# Patient Record
Sex: FEMALE | Race: WHITE | Employment: FULL TIME | URBAN - METROPOLITAN AREA
[De-identification: names, ages, dates, MRNs, and addresses within clinical notes are randomized per-mention and may not be internally consistent; named-entity substitution may affect disease eponyms.]

---

## 2017-01-01 ENCOUNTER — HOSPITAL ENCOUNTER (EMERGENCY)
Age: 37
Discharge: HOME | End: 2017-01-01
Attending: EMERGENCY MEDICINE
Payer: SELF-PAY

## 2017-01-01 VITALS
SYSTOLIC BLOOD PRESSURE: 128 MMHG | DIASTOLIC BLOOD PRESSURE: 77 MMHG | WEIGHT: 145.2 LBS | OXYGEN SATURATION: 96 % | TEMPERATURE: 98.7 F | BODY MASS INDEX: 25.73 KG/M2 | RESPIRATION RATE: 14 BRPM | HEART RATE: 88 BPM | HEIGHT: 63 IN

## 2017-01-01 DIAGNOSIS — J06.9 ACUTE UPPER RESPIRATORY INFECTION: ICD-10-CM

## 2017-01-01 DIAGNOSIS — J01.00 ACUTE NON-RECURRENT MAXILLARY SINUSITIS: Primary | ICD-10-CM

## 2017-01-01 PROCEDURE — 99283 EMERGENCY DEPT VISIT LOW MDM: CPT | Performed by: EMERGENCY MEDICINE

## 2017-01-01 RX ORDER — AMOXICILLIN 500 MG/1
500 TABLET, FILM COATED ORAL 3 TIMES DAILY
Qty: 21 TAB | Refills: 0 | Status: SHIPPED | OUTPATIENT
Start: 2017-01-01 | End: 2017-06-20

## 2017-01-01 NOTE — ED PROVIDER NOTES
HPI Comments: Patient is a 54-year-old female who is coming in with several days of having multiple symptoms. She has had a lot of sore throat and 2 of her relatives are tested positive for strep throat. She's also had a lot of sinus pressure and had a feeling like she is going to have diarrhea but never did have any. She denies any fever she's had an intermittent cough. Patient is a 39 y.o. female presenting with sore throat. The history is provided by the patient. Sore Throat    Pertinent negatives include no diarrhea, no vomiting, no shortness of breath, no stridor and no cough. Past Medical History:   Diagnosis Date    Borderline personality disorder     Depression     GERD (gastroesophageal reflux disease)        Past Surgical History:   Procedure Laterality Date    Hx adenoidectomy       at age 11   [employer] Hx wisdom teeth extraction           History reviewed. No pertinent family history. Social History     Social History    Marital status: SINGLE     Spouse name: N/A    Number of children: N/A    Years of education: N/A     Occupational History    Not on file. Social History Main Topics    Smoking status: Current Every Day Smoker     Packs/day: 0.50    Smokeless tobacco: Not on file    Alcohol use Yes      Comment: occ    Drug use: No    Sexual activity: Not on file     Other Topics Concern    Not on file     Social History Narrative         ALLERGIES: Review of patient's allergies indicates no known allergies. Review of Systems   Constitutional: Negative for chills and fever. HENT: Positive for sore throat. Respiratory: Negative for cough, chest tightness, shortness of breath, wheezing and stridor. Cardiovascular: Negative for chest pain and palpitations. Gastrointestinal: Negative for abdominal pain, diarrhea, nausea and vomiting. Skin: Negative. All other systems reviewed and are negative.       Vitals:    01/01/17 0318   BP: 128/77   Pulse: 88   Resp: 14 Temp: 98.7 °F (37.1 °C)   SpO2: 96%   Weight: 65.9 kg (145 lb 3.2 oz)   Height: 5' 3\" (1.6 m)            Physical Exam   Constitutional: She is oriented to person, place, and time. She appears well-developed and well-nourished. No distress. HENT:   Head: Normocephalic and atraumatic. Tonsils slightly inflamed bilaterally no exudate seen. Maxillary and ethmoid sinus tenderness with palpation bilaterally. Eyes: Conjunctivae and EOM are normal. Pupils are equal, round, and reactive to light. No scleral icterus. Neck: Normal range of motion. Neck supple. Cardiovascular: Normal rate, regular rhythm and normal heart sounds. Pulmonary/Chest: Effort normal and breath sounds normal. No stridor. No respiratory distress. She has no wheezes. She has no rales. Abdominal: Soft. She exhibits no distension. There is no tenderness. There is no rebound and no guarding. Lymphadenopathy:     She has cervical adenopathy (slightly tender anterior cervical adenopathy. ). Neurological: She is alert and oriented to person, place, and time. No focal weakness   Skin: Skin is warm and dry. No rash noted. She is not diaphoretic. Psychiatric: She has a normal mood and affect. Her behavior is normal.   Nursing note and vitals reviewed. MDM  Number of Diagnoses or Management Options  Diagnosis management comments: Given patient's exposure to strep and symptoms I will start her on antibiotics.     ED Course       Procedures

## 2017-01-01 NOTE — ED NOTES
The patient was given their discharge instructions and  was given prescriptions. The  patient verbalized understanding and had no additional questions. The patient was alert and was discharged via Ambulatory, without additional complaints at time of discharge.   No apparent distress noted

## 2017-01-01 NOTE — DISCHARGE INSTRUCTIONS
Sinusitis: Care Instructions  Your Care Instructions    Sinusitis is an infection of the lining of the sinus cavities in your head. Sinusitis often follows a cold. It causes pain and pressure in your head and face. In most cases, sinusitis gets better on its own in 1 to 2 weeks. But some mild symptoms may last for several weeks. Sometimes antibiotics are needed. Follow-up care is a key part of your treatment and safety. Be sure to make and go to all appointments, and call your doctor if you are having problems. It's also a good idea to know your test results and keep a list of the medicines you take. How can you care for yourself at home? · Take an over-the-counter pain medicine, such as acetaminophen (Tylenol), ibuprofen (Advil, Motrin), or naproxen (Aleve). Read and follow all instructions on the label. · If the doctor prescribed antibiotics, take them as directed. Do not stop taking them just because you feel better. You need to take the full course of antibiotics. · Be careful when taking over-the-counter cold or flu medicines and Tylenol at the same time. Many of these medicines have acetaminophen, which is Tylenol. Read the labels to make sure that you are not taking more than the recommended dose. Too much acetaminophen (Tylenol) can be harmful. · Breathe warm, moist air from a steamy shower, a hot bath, or a sink filled with hot water. Avoid cold, dry air. Using a humidifier in your home may help. Follow the directions for cleaning the machine. · Use saline (saltwater) nasal washes to help keep your nasal passages open and wash out mucus and bacteria. You can buy saline nose drops at a grocery store or drugstore. Or you can make your own at home by adding 1 teaspoon of salt and 1 teaspoon of baking soda to 2 cups of distilled water. If you make your own, fill a bulb syringe with the solution, insert the tip into your nostril, and squeeze gently. Tan Rolls your nose.   · Put a hot, wet towel or a warm gel pack on your face 3 or 4 times a day for 5 to 10 minutes each time. · Try a decongestant nasal spray like oxymetazoline (Afrin). Do not use it for more than 3 days in a row. Using it for more than 3 days can make your congestion worse. When should you call for help? Call your doctor now or seek immediate medical care if:  · You have new or worse swelling or redness in your face or around your eyes. · You have a new or higher fever. Watch closely for changes in your health, and be sure to contact your doctor if:  · You have new or worse facial pain. · The mucus from your nose becomes thicker (like pus) or has new blood in it. · You are not getting better as expected. Where can you learn more? Go to http://adalberto-viktoriya.info/. Enter X964 in the search box to learn more about \"Sinusitis: Care Instructions. \"  Current as of: July 29, 2016  Content Version: 11.1  © 5454-6068 [employer]. Care instructions adapted under license by [employer] (which disclaims liability or warranty for this information). If you have questions about a medical condition or this instruction, always ask your healthcare professional. [place] [address] any warranty or liability for your use of this information.

## 2017-06-20 ENCOUNTER — HOSPITAL ENCOUNTER (EMERGENCY)
Age: 37
Discharge: HOME | End: 2017-06-20
Attending: EMERGENCY MEDICINE
Payer: SELF-PAY

## 2017-06-20 VITALS
OXYGEN SATURATION: 99 % | WEIGHT: 131 LBS | TEMPERATURE: 98.2 F | BODY MASS INDEX: 22.36 KG/M2 | RESPIRATION RATE: 18 BRPM | DIASTOLIC BLOOD PRESSURE: 75 MMHG | SYSTOLIC BLOOD PRESSURE: 125 MMHG | HEIGHT: 64 IN | HEART RATE: 85 BPM

## 2017-06-20 DIAGNOSIS — L08.9 SKIN INFECTION: Primary | ICD-10-CM

## 2017-06-20 PROCEDURE — 99283 EMERGENCY DEPT VISIT LOW MDM: CPT | Performed by: EMERGENCY MEDICINE

## 2017-06-20 PROCEDURE — 74011250637 HC RX REV CODE- 250/637: Performed by: EMERGENCY MEDICINE

## 2017-06-20 RX ORDER — MEDROXYPROGESTERONE ACETATE 150 MG/ML
150 INJECTION, SUSPENSION INTRAMUSCULAR
COMMUNITY
End: 2018-08-12

## 2017-06-20 RX ORDER — SULFAMETHOXAZOLE AND TRIMETHOPRIM 800; 160 MG/1; MG/1
1 TABLET ORAL 2 TIMES DAILY
Qty: 14 TAB | Refills: 0 | Status: SHIPPED | OUTPATIENT
Start: 2017-06-20 | End: 2017-06-27

## 2017-06-20 RX ORDER — SULFAMETHOXAZOLE AND TRIMETHOPRIM 800; 160 MG/1; MG/1
1 TABLET ORAL
Status: COMPLETED | OUTPATIENT
Start: 2017-06-20 | End: 2017-06-20

## 2017-06-20 RX ADMIN — SULFAMETHOXAZOLE AND TRIMETHOPRIM 1 TABLET: 800; 160 TABLET ORAL at 02:37

## 2017-06-20 NOTE — DISCHARGE INSTRUCTIONS

## 2017-06-20 NOTE — ED PROVIDER NOTES
HPI Comments: Patient states she thinks she was bitten by a bug on the back of her left lower leg 2 days ago. The area started swelling and draining slightly. She squeezed it and got some fluid out of it earlier tonight. She has not taken any medicine for her symptoms and denies any aggravating or relieving factors. She denies similar symptoms in the past.    Elements of this note were made using speech recognition software. As such, errors of speech recognition may occur. Patient is a 40 y.o. female presenting with skin problem. The history is provided by the patient. Skin Problem           Past Medical History:   Diagnosis Date    Borderline personality disorder     Depression     GERD (gastroesophageal reflux disease)        Past Surgical History:   Procedure Laterality Date    HX ADENOIDECTOMY      at age 11   [de-identified] WISDOM TEETH EXTRACTION           No family history on file. Social History     Social History    Marital status: SINGLE     Spouse name: N/A    Number of children: N/A    Years of education: N/A     Occupational History    Not on file. Social History Main Topics    Smoking status: Current Every Day Smoker     Packs/day: 0.50    Smokeless tobacco: Not on file    Alcohol use Yes      Comment: rare    Drug use: No    Sexual activity: Not on file     Other Topics Concern    Not on file     Social History Narrative         ALLERGIES: Review of patient's allergies indicates no known allergies. Review of Systems   Constitutional: Negative for chills and fever. Gastrointestinal: Negative for nausea and vomiting. All other systems reviewed and are negative. Vitals:    06/20/17 0029   BP: 131/82   Pulse: 90   Resp: 18   Temp: 98.2 °F (36.8 °C)   SpO2: 98%   Weight: 59.4 kg (131 lb)   Height: 5' 4\" (1.626 m)            Physical Exam   Constitutional: She is oriented to person, place, and time. She appears well-developed and well-nourished.    HENT:   Head: Normocephalic and atraumatic. Eyes: Conjunctivae are normal. Pupils are equal, round, and reactive to light. Neck: Normal range of motion. Neck supple. Musculoskeletal: She exhibits no edema or tenderness. Legs:  Small area of induration left lower posterior calf midline  As indicated. There is induration but no fluctuance   Neurological: She is alert and oriented to person, place, and time. Skin: Skin is warm and dry. Psychiatric: She has a normal mood and affect. Her behavior is normal.   Nursing note and vitals reviewed. MDM  Number of Diagnoses or Management Options  Skin infection: new and does not require workup  Diagnosis management comments: Differential diagnosis: Cellulitis, erysipelas, abscess  2:27 AM area is not amenable to incision and drainage.   Discussed this with patient, need for antibiotics    Risk of Complications, Morbidity, and/or Mortality  Presenting problems: moderate  Diagnostic procedures: moderate  Management options: moderate    Patient Progress  Patient progress: stable    ED Course       Procedures

## 2017-06-20 NOTE — LETTER
[address] [facility] EMERGENCY DEPT 
[facility] [address] [place] [id number] 
[phone] Work/School Note Date: 6/20/2017 To Whom It May concern: 
 
Edilberto Marley was seen and treated today in the emergency room by the following provider(s): 
Attending Provider: Jorge Dixon MD.   
 
Edilberto Marley may return to work on Wednesday 6/21/17 Sincerely, Valentín Dallas RN

## 2017-06-20 NOTE — ED NOTES
Gave med as ordered.  Pt verbalized understanding of discharge instructions and prescription, signed form and ambualted out in nad

## 2018-08-12 ENCOUNTER — HOSPITAL ENCOUNTER (EMERGENCY)
Age: 38
Discharge: HOME | End: 2018-08-12
Attending: EMERGENCY MEDICINE
Payer: COMMERCIAL

## 2018-08-12 ENCOUNTER — APPOINTMENT (OUTPATIENT)
Dept: GENERAL RADIOLOGY | Age: 38
End: 2018-08-12
Attending: EMERGENCY MEDICINE
Payer: COMMERCIAL

## 2018-08-12 VITALS
WEIGHT: 128.4 LBS | HEART RATE: 74 BPM | DIASTOLIC BLOOD PRESSURE: 85 MMHG | HEIGHT: 64 IN | OXYGEN SATURATION: 99 % | SYSTOLIC BLOOD PRESSURE: 135 MMHG | TEMPERATURE: 98.1 F | RESPIRATION RATE: 16 BRPM | BODY MASS INDEX: 21.92 KG/M2
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
## 2018-08-12 DIAGNOSIS — R07.81 RIB PAIN ON LEFT SIDE: ICD-10-CM

## 2018-08-12 LAB — HCG UR QL: NEGATIVE

## 2018-08-12 PROCEDURE — 74011250636 HC RX REV CODE- 250/636: Performed by: NURSE PRACTITIONER

## 2018-08-12 PROCEDURE — 81025 URINE PREGNANCY TEST: CPT

## 2018-08-12 PROCEDURE — 90471 IMMUNIZATION ADMIN: CPT | Performed by: NURSE PRACTITIONER

## 2018-08-12 PROCEDURE — 71100 X-RAY EXAM RIBS UNI 2 VIEWS: CPT

## 2018-08-12 PROCEDURE — 90715 TDAP VACCINE 7 YRS/> IM: CPT | Performed by: NURSE PRACTITIONER

## 2018-08-12 PROCEDURE — 73130 X-RAY EXAM OF HAND: CPT

## 2018-08-12 PROCEDURE — 99283 EMERGENCY DEPT VISIT LOW MDM: CPT | Performed by: NURSE PRACTITIONER

## 2018-08-12 RX ORDER — TRAMADOL HYDROCHLORIDE 50 MG/1
50 TABLET ORAL
Qty: 20 TAB | Refills: 0 | Status: SHIPPED | OUTPATIENT
Start: 2018-08-12 | End: 2019-10-01

## 2018-08-12 RX ADMIN — TETANUS TOXOID, REDUCED DIPHTHERIA TOXOID AND ACELLULAR PERTUSSIS VACCINE, ADSORBED 0.5 ML: 5; 2.5; 8; 8; 2.5 SUSPENSION INTRAMUSCULAR at 16:32

## 2018-08-12 NOTE — LETTER
[address] [facility] EMERGENCY DEPT 
[facility] [address] [place] [id number] 
[phone] Work/School Note Date: 8/12/2018 To Whom It May concern: 
 
Lisa Fung was seen and treated today in the emergency room by the following provider(s): 
Attending Provider: Brea Bowers MD 
Nurse Practitioner: Katja Schilling NP. Samia Arizmendi may return to work on tomorrow with light duty for three days. Sincerely, Katja Schilling NP
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
## 2018-08-12 NOTE — ED NOTES
I have reviewed discharge instructions with the patient. The patient verbalized understanding. Patient left ED via Discharge Method: ambulatory to Home with self. Opportunity for questions and clarification provided. Patient given 1 scripts. To continue your aftercare when you leave the hospital, you may receive an automated call from our care team to check in on how you are doing. This is a free service and part of our promise to provide the best care and service to meet your aftercare needs.  If you have questions, or wish to unsubscribe from this service please call [phone]. Thank you for Choosing our [address] [place] [address] Emergency Department.

## 2018-08-12 NOTE — DISCHARGE INSTRUCTIONS
Musculoskeletal Chest Pain: Care Instructions  Your Care Instructions    Chest pain is not always a sign that something is wrong with your heart or that you have another serious problem. The doctor thinks your chest pain is caused by strained muscles or ligaments, inflamed chest cartilage, or another problem in your chest, rather than by your heart. You may need more tests to find the cause of your chest pain. Follow-up care is a key part of your treatment and safety. Be sure to make and go to all appointments, and call your doctor if you are having problems. It's also a good idea to know your test results and keep a list of the medicines you take. How can you care for yourself at home? · Take pain medicines exactly as directed. ¨ If the doctor gave you a prescription medicine for pain, take it as prescribed. ¨ If you are not taking a prescription pain medicine, ask your doctor if you can take an over-the-counter medicine. · Rest and protect the sore area. · Stop, change, or take a break from any activity that may be causing your pain or soreness. · Put ice or a cold pack on the sore area for 10 to 20 minutes at a time. Try to do this every 1 to 2 hours for the next 3 days (when you are awake) or until the swelling goes down. Put a thin cloth between the ice and your skin. · After 2 or 3 days, apply a heating pad set on low or a warm cloth to the area that hurts. Some doctors suggest that you go back and forth between hot and cold. · Do not wrap or tape your ribs for support. This may cause you to take smaller breaths, which could increase your risk of lung problems. · Mentholated creams such as Bengay or Icy Hot may soothe sore muscles. Follow the instructions on the package. · Follow your doctor's instructions for exercising. · Gentle stretching and massage may help you get better faster. Stretch slowly to the point just before pain begins, and hold the stretch for at least 15 to 30 seconds.  Do this 3 or 4 times a day. Stretch just after you have applied heat. · As your pain gets better, slowly return to your normal activities. Any increased pain may be a sign that you need to rest a while longer. When should you call for help? Call 911 anytime you think you may need emergency care. For example, call if:    · You have chest pain or pressure. This may occur with:  ¨ Sweating. ¨ Shortness of breath. ¨ Nausea or vomiting. ¨ Pain that spreads from the chest to the neck, jaw, or one or both shoulders or arms. ¨ Dizziness or lightheadedness. ¨ A fast or uneven pulse. After calling 911, chew 1 adult-strength aspirin. Wait for an ambulance. Do not try to drive yourself.     · You have sudden chest pain and shortness of breath, or you cough up blood.    Call your doctor now or seek immediate medical care if:    · You have any trouble breathing.     · Your chest pain gets worse.     · Your chest pain occurs consistently with exercise and is relieved by rest.    Watch closely for changes in your health, and be sure to contact your doctor if:    · Your chest pain does not get better after 1 week. Where can you learn more? Go to http://adalberto-viktoriya.info/. Enter V293 in the search box to learn more about \"Musculoskeletal Chest Pain: Care Instructions. \"  Current as of: November 20, 2017  Content Version: 11.7  © 1002-8683 [employer]. Care instructions adapted under license by [employer] (which disclaims liability or warranty for this information). If you have questions about a medical condition or this instruction, always ask your healthcare professional. [place] [address] any warranty or liability for your use of this information.
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
## 2019-10-01 ENCOUNTER — HOSPITAL ENCOUNTER (EMERGENCY)
Age: 39
Discharge: HOME | End: 2019-10-01
Attending: EMERGENCY MEDICINE
Payer: COMMERCIAL

## 2019-10-01 VITALS
BODY MASS INDEX: 22.04 KG/M2 | RESPIRATION RATE: 16 BRPM | SYSTOLIC BLOOD PRESSURE: 104 MMHG | TEMPERATURE: 98.2 F | DIASTOLIC BLOOD PRESSURE: 63 MMHG | OXYGEN SATURATION: 100 % | HEIGHT: 64 IN | HEART RATE: 78 BPM

## 2019-10-01 DIAGNOSIS — S39.012A ACUTE MYOFASCIAL STRAIN OF LUMBAR REGION, INITIAL ENCOUNTER: Primary | ICD-10-CM

## 2019-10-01 PROCEDURE — 99283 EMERGENCY DEPT VISIT LOW MDM: CPT | Performed by: EMERGENCY MEDICINE

## 2019-10-01 RX ORDER — PREDNISONE 20 MG/1
40 TABLET ORAL DAILY
Qty: 10 TAB | Refills: 0 | Status: SHIPPED | OUTPATIENT
Start: 2019-10-01 | End: 2019-10-06

## 2019-10-01 RX ORDER — NAPROXEN 500 MG/1
500 TABLET ORAL 2 TIMES DAILY WITH MEALS
Qty: 20 TAB | Refills: 0 | Status: SHIPPED | OUTPATIENT
Start: 2019-10-01 | End: 2020-11-10

## 2019-10-01 RX ORDER — METHOCARBAMOL 750 MG/1
750 TABLET, FILM COATED ORAL 4 TIMES DAILY
Qty: 40 TAB | Refills: 0 | Status: SHIPPED | OUTPATIENT
Start: 2019-10-01 | End: 2020-11-10

## 2019-10-01 NOTE — ED TRIAGE NOTES
Lower back pain for one month and it is getting worse. 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 States \"pain is on right side and goes into butt cheek. \"

## 2019-10-01 NOTE — ED NOTES
I have reviewed discharge instructions with the patient. The patient verbalized understanding. Patient left ED via Discharge Method: ambulatory to Home with self. Opportunity for questions and clarification provided. Patient given 3 scripts. To continue your aftercare when you leave the hospital, you may receive an automated call from our care team to check in on how you are doing. This is a free service and part of our promise to provide the best care and service to meet your aftercare needs.  If you have questions, or wish to unsubscribe from this service please call [phone]. Thank you for Choosing our [facility] Emergency Department.

## 2019-10-01 NOTE — DISCHARGE INSTRUCTIONS
No lifting, bending or other strenous activities  Call your doctor/follow up doctor to set up appointment for recheck visit  Return to ER for any worsening symptoms or new problems which may arise

## 2019-10-01 NOTE — ED PROVIDER NOTES
The history is provided by the patient. LOW BACK PAIN    This is a new problem. The current episode started more than 2 days ago. The problem has been gradually worsening. The problem occurs constantly. The pain is associated with no known injury. The pain is present in the lower back and right side. The quality of the pain is described as aching and burning. Radiates to: Right hip. The pain is moderate. The symptoms are aggravated by twisting, bending and certain positions. Pertinent negatives include no chest pain, no fever, no numbness, no weight loss, no headaches, no abdominal pain, no bowel incontinence, no perianal numbness, no bladder incontinence, no dysuria, no paresis, no tingling and no weakness. She has tried nothing for the symptoms. The patient's surgical history non-contributory        Past Medical History:   Diagnosis Date    Borderline personality disorder ([address]     Depression     GERD (gastroesophageal reflux disease)        Past Surgical History:   Procedure Laterality Date    HX ADENOIDECTOMY      at age 11   [de-identified] WISDOM TEETH EXTRACTION           History reviewed. No pertinent family history.     Social History     Socioeconomic History    Marital status: SINGLE     Spouse name: Not on file    Number of children: Not on file    Years of education: Not on file    Highest education level: Not on file   Occupational History    Not on file   Social Needs    Financial resource strain: Not on file    Food insecurity:     Worry: Not on file     Inability: Not on file    Transportation needs:     Medical: Not on file     Non-medical: Not on file   Tobacco Use    Smoking status: Current Every Day Smoker     Packs/day: 0.50    Smokeless tobacco: Never Used   Substance and Sexual Activity    Alcohol use: Yes     Comment: rare    Drug use: No    Sexual activity: Not on file   Lifestyle    Physical activity:     Days per week: Not on file     Minutes per session: Not on file    Stress: Not on file   Relationships    Social connections:     Talks on phone: Not on file     Gets together: Not on file     Attends [employer] service: Not on file     Active member of club or organization: Not on file     Attends meetings of clubs or organizations: Not on file     Relationship status: Not on file    Intimate partner violence:     Fear of current or ex partner: Not on file     Emotionally abused: Not on file     Physically abused: Not on file     Forced sexual activity: Not on file   Other Topics Concern    Not on file   Social History Narrative    Not on file         ALLERGIES: Patient has no known allergies. Review of Systems   Constitutional: Negative for chills, fever and weight loss. HENT: Negative for congestion, ear pain and rhinorrhea. Eyes: Negative for photophobia and discharge. Respiratory: Negative for cough and shortness of breath. Cardiovascular: Negative for chest pain and palpitations. Gastrointestinal: Negative for abdominal pain, bowel incontinence, constipation, diarrhea and vomiting. Endocrine: Negative for cold intolerance and heat intolerance. Genitourinary: Negative for bladder incontinence, dysuria and flank pain. Musculoskeletal: Negative for arthralgias, myalgias and neck pain. Skin: Negative for rash and wound. Allergic/Immunologic: Negative for environmental allergies and food allergies. Neurological: Negative for tingling, syncope, weakness, numbness and headaches. Hematological: Negative for adenopathy. Does not bruise/bleed easily. Psychiatric/Behavioral: Negative for dysphoric mood. The patient is not nervous/anxious. All other systems reviewed and are negative. Vitals:    10/01/19 1717   BP: 96/61   Pulse: 84   Resp: 16   Temp: 98.8 °F (37.1 °C)   SpO2: 98%   Height: 5' 4\" (1.626 m)            Physical Exam   Constitutional: She is oriented to person, place, and time. She appears well-developed and well-nourished.  She appears distressed. HENT:   Head: Normocephalic and atraumatic. Mouth/Throat: Oropharynx is clear and moist. No oropharyngeal exudate. Eyes: Pupils are equal, round, and reactive to light. Conjunctivae and EOM are normal.   Neck: Normal range of motion. Neck supple. No JVD present. Cardiovascular: Normal rate, regular rhythm, normal heart sounds and intact distal pulses. Exam reveals no gallop and no friction rub. No murmur heard. Pulmonary/Chest: Effort normal and breath sounds normal.   Abdominal: Soft. Normal appearance and bowel sounds are normal. She exhibits no distension and no mass. There is no hepatosplenomegaly. There is no tenderness. Musculoskeletal: Normal range of motion. She exhibits no edema or deformity. Back:    Neurological: She is alert and oriented to person, place, and time. She has normal strength. No cranial nerve deficit or sensory deficit. She displays a negative Romberg sign. Gait normal. GCS eye subscore is 4. GCS verbal subscore is 5. GCS motor subscore is 6. Straight leg raise is negative     Skin: Skin is warm and dry. Capillary refill takes less than 2 seconds. No rash noted. Psychiatric: She has a normal mood and affect. Her speech is normal and behavior is normal. Judgment and thought content normal. Cognition and memory are normal.   Nursing note and vitals reviewed. MDM  Number of Diagnoses or Management Options  Acute myofascial strain of lumbar region, initial encounter: new and does not require workup  Diagnosis management comments: Symptoms are consistent with a mild sciatica and likely lumbar muscle strain    Neurologically the patient is intact with no other concerning signs or symptoms today.     Will initiate symptomatic treatment with steroids and muscle relaxers  The primary care for follow-up       Amount and/or Complexity of Data Reviewed  Review and summarize past medical records: yes    Risk of Complications, Morbidity, and/or Mortality  Presenting problems: low  Diagnostic procedures: minimal  Management options: low  General comments: Elements of this note have been dictated via voice recognition software. Text and phrases may be limited by the accuracy of the software. The chart has been reviewed, but errors may still be present.       Patient Progress  Patient progress: stable         Procedures

## 2020-02-06 ENCOUNTER — HOSPITAL ENCOUNTER (EMERGENCY)
Age: 40
Discharge: HOME | End: 2020-02-06
Attending: EMERGENCY MEDICINE
Payer: COMMERCIAL

## 2020-02-06 VITALS
RESPIRATION RATE: 20 BRPM | HEIGHT: 64 IN | TEMPERATURE: 98.4 F | DIASTOLIC BLOOD PRESSURE: 80 MMHG | HEART RATE: 75 BPM | OXYGEN SATURATION: 100 % | WEIGHT: 125.5 LBS | BODY MASS INDEX: 21.43 KG/M2 | SYSTOLIC BLOOD PRESSURE: 124 MMHG

## 2020-02-06 DIAGNOSIS — Z87.448 HISTORY OF HEMATURIA: ICD-10-CM

## 2020-02-06 DIAGNOSIS — R82.81 PYURIA: Primary | ICD-10-CM

## 2020-02-06 LAB
BACTERIA URNS QL MICRO: 0 /HPF
CASTS URNS QL MICRO: NORMAL /LPF
EPI CELLS #/AREA URNS HPF: NORMAL /HPF
HCG UR QL: NEGATIVE
RBC #/AREA URNS HPF: NORMAL /HPF
WBC URNS QL MICRO: NORMAL /HPF

## 2020-02-06 PROCEDURE — 87491 CHLMYD TRACH DNA AMP PROBE: CPT

## 2020-02-06 PROCEDURE — 99284 EMERGENCY DEPT VISIT MOD MDM: CPT

## 2020-02-06 PROCEDURE — 74011000250 HC RX REV CODE- 250: Performed by: EMERGENCY MEDICINE

## 2020-02-06 PROCEDURE — 81003 URINALYSIS AUTO W/O SCOPE: CPT

## 2020-02-06 PROCEDURE — 74011250637 HC RX REV CODE- 250/637: Performed by: EMERGENCY MEDICINE

## 2020-02-06 PROCEDURE — 74011250636 HC RX REV CODE- 250/636: Performed by: EMERGENCY MEDICINE

## 2020-02-06 PROCEDURE — 81015 MICROSCOPIC EXAM OF URINE: CPT

## 2020-02-06 PROCEDURE — 81025 URINE PREGNANCY TEST: CPT

## 2020-02-06 PROCEDURE — 96372 THER/PROPH/DIAG INJ SC/IM: CPT

## 2020-02-06 RX ORDER — AZITHROMYCIN 250 MG/1
1000 TABLET, FILM COATED ORAL
Status: COMPLETED | OUTPATIENT
Start: 2020-02-06 | End: 2020-02-06

## 2020-02-06 RX ADMIN — LIDOCAINE HYDROCHLORIDE 250 MG: 10 INJECTION, SOLUTION INFILTRATION; PERINEURAL at 19:43

## 2020-02-06 RX ADMIN — AZITHROMYCIN 1000 MG: 250 TABLET, FILM COATED ORAL at 19:42

## 2020-02-06 NOTE — ED NOTES
Patient to ED via POV. Patient reports she recently had a physical and was told she had hematuria. Patient denies any painful urination. Patient does report some polyuria, low back pain increasing over past few weeks. Patient reports [facility] appx 2 weeks ago. Patient also inquires about STD testing. Patient denies any vag discharge, vaginal pain.

## 2020-02-07 NOTE — DISCHARGE INSTRUCTIONS
Follow-up with the doctor provided in 3 to 5 days for follow-up of urine culture and further outpatient evaluation. Return if any new, worsening or concerning symptoms.

## 2020-02-07 NOTE — ED NOTES
I have reviewed discharge instructions with the patient. The patient verbalized understanding. Patient left ED via Discharge Method: ambulatory to Home with self. Opportunity for questions and clarification provided. Patient given 0 scripts. To continue your aftercare when you leave the hospital, you may receive an automated call from our care team to check in on how you are doing. This is a free service and part of our promise to provide the best care and service to meet your aftercare needs.  If you have questions, or wish to unsubscribe from this service please call [phone]. Thank you for Choosing our [facility] Emergency Department.

## 2020-02-07 NOTE — ED PROVIDER NOTES
27-year-old female complains that very recently in a physical for work at [facility] [employer] they found the blood in her urine. The blood was microscopic. She denies any urinary symptoms. She has had some intermittent low back pain that is worse with movement. She is concerned about STDs due to some recent unprotected sex but denies any vaginal discharge or vaginal bleeding. No urgency frequency or dysuria. No fevers or chills. The history is provided by the patient. Past Medical History:   Diagnosis Date    Borderline personality disorder ([address]     Depression     GERD (gastroesophageal reflux disease)        Past Surgical History:   Procedure Laterality Date    HX ADENOIDECTOMY      at age 11   Luzmaria Schilder WISDOM TEETH EXTRACTION           No family history on file.     Social History     Socioeconomic History    Marital status: SINGLE     Spouse name: Not on file    Number of children: Not on file    Years of education: Not on file    Highest education level: Not on file   Occupational History    Not on file   Social Needs    Financial resource strain: Not on file    Food insecurity:     Worry: Not on file     Inability: Not on file    Transportation needs:     Medical: Not on file     Non-medical: Not on file   Tobacco Use    Smoking status: Current Every Day Smoker     Packs/day: 0.50    Smokeless tobacco: Never Used   Substance and Sexual Activity    Alcohol use: Yes     Comment: rare    Drug use: No    Sexual activity: Not on file   Lifestyle    Physical activity:     Days per week: Not on file     Minutes per session: Not on file    Stress: Not on file   Relationships    Social connections:     Talks on phone: Not on file     Gets together: Not on file     Attends [employer] service: Not on file     Active member of club or organization: Not on file     Attends meetings of clubs or organizations: Not on file     Relationship status: Not on file    Intimate partner violence:     Fear of current or ex partner: Not on file     Emotionally abused: Not on file     Physically abused: Not on file     Forced sexual activity: Not on file   Other Topics Concern    Not on file   Social History Narrative    Not on file         ALLERGIES: Patient has no known allergies. Review of Systems   Constitutional: Negative for fever. Gastrointestinal: Negative for abdominal pain, constipation, diarrhea, nausea and vomiting. Endocrine: Negative for polyuria. Genitourinary: Negative for dysuria, frequency, urgency, vaginal bleeding and vaginal discharge. Musculoskeletal: Positive for back pain. Neurological: Negative for weakness and numbness. Vitals:    02/06/20 1843   BP: 124/82   Pulse: 75   Resp: 20   Temp: 98.2 °F (36.8 °C)   SpO2: 98%   Weight: 56.9 kg (125 lb 8 oz)   Height: 5' 4\" (1.626 m)            Physical Exam  Vitals signs and nursing note reviewed. Constitutional:       General: She is not in acute distress. Appearance: Normal appearance. She is well-developed. HENT:      Head: Normocephalic. Eyes:      Pupils: Pupils are equal, round, and reactive to light. Cardiovascular:      Rate and Rhythm: Normal rate and regular rhythm. Heart sounds: Normal heart sounds. Pulmonary:      Effort: Pulmonary effort is normal.      Breath sounds: Normal breath sounds. Abdominal:      General: There is no distension. Palpations: Abdomen is soft. There is no mass. Tenderness: There is no abdominal tenderness. There is no guarding or rebound. Musculoskeletal: Normal range of motion. General: No tenderness. Comments: No CVA tenderness   Lymphadenopathy:      Cervical: No cervical adenopathy. Skin:     General: Skin is warm and dry. Neurological:      Mental Status: She is alert. MDM  Number of Diagnoses or Management Options  Diagnosis management comments: History and exam and current clinical scenario not consistent with renal colic.   There is no microscopic hematuria at this time but there is some pyuria but no bacteriuria. Urine culture ordered. Gonorrhea and Chlamydia DNA probe on the urine is ordered. Patient offered and accepts treatment for STD. Urine pregnancy test was negative.        Amount and/or Complexity of Data Reviewed  Clinical lab tests: ordered and reviewed (Results for orders placed or performed during the hospital encounter of 02/06/20  -URINE MICROSCOPIC       Result                      Value             Ref Range           WBC                         20-50             0 /hpf              RBC                         0-3               0 /hpf              Epithelial cells            0-3               0 /hpf              Bacteria                    0                 0 /hpf              Casts                       0-3               0 /lpf         -HCG URINE, QL. - POC       Result                      Value             Ref Range           Pregnancy test,urine (*     NEGATIVE          NEG            )           Procedures

## 2020-02-11 LAB
C TRACH RRNA SPEC QL NAA+PROBE: NEGATIVE
N GONORRHOEA RRNA SPEC QL NAA+PROBE: NEGATIVE
SPECIMEN SOURCE: NORMAL

## 2020-11-10 ENCOUNTER — HOSPITAL ENCOUNTER (EMERGENCY)
Age: 40
Discharge: HOME | End: 2020-11-10
Attending: EMERGENCY MEDICINE
Payer: MEDICAID

## 2020-11-10 VITALS
HEIGHT: 64 IN | TEMPERATURE: 98.3 F | SYSTOLIC BLOOD PRESSURE: 151 MMHG | HEART RATE: 76 BPM | WEIGHT: 125.44 LBS | RESPIRATION RATE: 16 BRPM | DIASTOLIC BLOOD PRESSURE: 81 MMHG | BODY MASS INDEX: 21.42 KG/M2 | OXYGEN SATURATION: 98 %

## 2020-11-10 DIAGNOSIS — N39.0 URINARY TRACT INFECTION WITHOUT HEMATURIA, SITE UNSPECIFIED: Primary | ICD-10-CM

## 2020-11-10 LAB
BACTERIA URNS QL MICRO: NORMAL /HPF
CASTS URNS QL MICRO: 0 /LPF
CRYSTALS URNS QL MICRO: 0 /LPF
EPI CELLS #/AREA URNS HPF: NORMAL /HPF
HCG UR QL: NEGATIVE
MUCOUS THREADS URNS QL MICRO: 0 /LPF
RBC #/AREA URNS HPF: NORMAL /HPF
WBC URNS QL MICRO: NORMAL /HPF

## 2020-11-10 PROCEDURE — 81003 URINALYSIS AUTO W/O SCOPE: CPT

## 2020-11-10 PROCEDURE — 81015 MICROSCOPIC EXAM OF URINE: CPT

## 2020-11-10 PROCEDURE — 81025 URINE PREGNANCY TEST: CPT

## 2020-11-10 PROCEDURE — 99284 EMERGENCY DEPT VISIT MOD MDM: CPT

## 2020-11-10 RX ORDER — CEPHALEXIN 500 MG/1
500 CAPSULE ORAL 4 TIMES DAILY
Qty: 28 CAP | Refills: 0 | Status: SHIPPED | OUTPATIENT
Start: 2020-11-10 | End: 2020-11-17

## 2020-11-10 NOTE — ED NOTES
I have reviewed discharge instructions with the patient. The patient verbalized understanding. Patient left ED via Discharge Method: ambulatory to Home with self. Opportunity for questions and clarification provided. Patient given 1 scripts. To continue your aftercare when you leave the hospital, you may receive an automated call from our care team to check in on how you are doing. This is a free service and part of our promise to provide the best care and service to meet your aftercare needs.  If you have questions, or wish to unsubscribe from this service please call [phone]. Thank you for Choosing our [facility] Emergency Department.

## 2020-11-10 NOTE — DISCHARGE INSTRUCTIONS

## 2020-11-10 NOTE — ED TRIAGE NOTES
Pt c/o burning with urination. States she stood up to go into work and she urinated on herself which is abnormal for her. States started yesterday.

## 2020-11-10 NOTE — LETTER
[id number] [address] EMERGENCY DEPT 
[id number] [facility] [id number] 
[phone] Work/School Note Date: 11/10/2020 To Whom It May concern: 
 
 
Fish Lemon was seen and treated today in the emergency room by the following provider(s): 
Attending Provider: Luz Elena Degroot MD 
Physician Assistant: MAXWELL Rosario. Fish Lemon is excused from work/school on 11/10/20. She is clear to return to work/school on 11/11/20. Sincerely, MAXWELL Portillo

## 2020-11-10 NOTE — ED PROVIDER NOTES
The history is provided by the patient. Urinary Pain    This is a new problem. The current episode started yesterday. The problem occurs every urination. The problem has been gradually worsening. The quality of the pain is described as aching. The pain is at a severity of 1/10. The pain is mild. She is sexually active. There is no history of pyelonephritis. Associated symptoms include frequency. The patient is not pregnant. She has tried nothing for the symptoms. The treatment provided no relief. Her past medical history does not include kidney stones or recurrent UTIs. Past Medical History:   Diagnosis Date    Borderline [address] disorder ([facility] [address]     Depression     GERD (gastroesophageal reflux disease)        Past Surgical History:   Procedure Laterality Date    HX ADENOIDECTOMY      at age 11   Phineas Began WISDOM TEETH EXTRACTION           History reviewed. No pertinent family history.     Social History     Socioeconomic History    Marital status: SINGLE     Spouse name: Not on file    Number of children: Not on file    Years of education: Not on file    Highest education level: Not on file   Occupational History    Not on file   Social Needs    Financial resource strain: Not on file    Food insecurity     Worry: Not on file     Inability: Not on file    Transportation needs     Medical: Not on file     Non-medical: Not on file   Tobacco Use    Smoking status: Current Every Day Smoker     Packs/day: 0.50    Smokeless tobacco: Never Used   Substance and Sexual Activity    Alcohol use: Yes     Comment: rare    Drug use: No    Sexual activity: Not on file   Lifestyle    Physical activity     Days per week: Not on file     Minutes per session: Not on file    Stress: Not on file   Relationships    Social connections     Talks on phone: Not on file     Gets together: Not on file     Attends [employer] service: Not on file     Active member of club or organization: Not on file     Attends meetings of clubs or organizations: Not on file     Relationship status: Not on file    Intimate partner violence     Fear of current or ex partner: Not on file     Emotionally abused: Not on file     Physically abused: Not on file     Forced sexual activity: Not on file   Other Topics Concern    Not on file   Social History Narrative    Not on file         ALLERGIES: Patient has no known allergies. Review of Systems   Genitourinary: Positive for frequency. All other systems reviewed and are negative. Vitals:    11/10/20 1115   BP: (!) 151/81   Pulse: 76   Resp: 16   Temp: 98.3 °F (36.8 °C)   SpO2: 98%   Weight: 56.9 kg (125 lb 7.1 oz)   Height: 5' 4\" (1.626 m)            Physical Exam  Vitals signs and nursing note reviewed. Constitutional:       General: She is not in acute distress. Appearance: Normal appearance. She is well-developed and normal weight. She is not diaphoretic. HENT:      Head: Normocephalic and atraumatic. Eyes:      Pupils: Pupils are equal, round, and reactive to light. Neck:      Musculoskeletal: Normal range of motion and neck supple. Cardiovascular:      Rate and Rhythm: Normal rate and regular rhythm. Pulmonary:      Effort: Pulmonary effort is normal.      Breath sounds: Normal breath sounds. Abdominal:      General: Abdomen is flat. Bowel sounds are normal.      Palpations: Abdomen is soft. Tenderness: There is no abdominal tenderness. Hernia: No hernia is present. Musculoskeletal: Normal range of motion. Skin:     General: Skin is warm. Neurological:      General: No focal deficit present. Mental Status: She is alert and oriented to person, place, and time.    Psychiatric:         Mood and Affect: Mood normal.         Behavior: Behavior normal.          MDM  Number of Diagnoses or Management Options  Diagnosis management comments: hcg -  Urine with wbc and bacteria will treat with keflex, work note given        Amount and/or Complexity of Data Reviewed  Clinical lab tests: ordered and reviewed  Review and summarize past medical records: yes    Risk of Complications, Morbidity, and/or Mortality  Presenting problems: low  Diagnostic procedures: low  Management options: low    Patient Progress  Patient progress: improved         Procedures

## 2021-01-13 PROBLEM — O09.529 AMA (ADVANCED MATERNAL AGE) MULTIGRAVIDA 35+: Status: ACTIVE | Noted: 2021-01-13

## 2021-01-13 PROBLEM — F32.A ANXIETY AND DEPRESSION: Status: ACTIVE | Noted: 2021-01-13

## 2021-01-13 PROBLEM — F19.21 HISTORY OF DRUG DEPENDENCE/ABUSE (HCC): Status: ACTIVE | Noted: 2021-01-13

## 2021-01-13 PROBLEM — F44.81 MULTIPLE PERSONALITY DISORDER (HCC): Status: ACTIVE | Noted: 2021-01-13

## 2021-01-13 PROBLEM — F41.9 ANXIETY AND DEPRESSION: Status: ACTIVE | Noted: 2021-01-13

## 2021-01-13 PROBLEM — O09.519 AMA (ADVANCED MATERNAL AGE) PRIMIGRAVIDA 35+: Status: ACTIVE | Noted: 2021-01-13

## 2021-01-13 PROBLEM — O99.330 TOBACCO USE IN PREGNANCY: Status: ACTIVE | Noted: 2021-01-13

## 2021-01-13 PROBLEM — O09.529 AMA (ADVANCED MATERNAL AGE) MULTIGRAVIDA 35+: Status: RESOLVED | Noted: 2021-01-13 | Resolved: 2021-01-13

## 2021-01-25 PROBLEM — O09.511 PRIMIGRAVIDA OF ADVANCED MATERNAL AGE IN FIRST TRIMESTER: Status: ACTIVE | Noted: 2021-01-13

## 2021-01-25 PROBLEM — Z63.79 MENTAL ILLNESS IN MEMBER OF HOUSEHOLD: Status: ACTIVE | Noted: 2021-01-13

## 2021-01-25 PROBLEM — Z86.59 HISTORY OF ANXIETY DISORDER: Status: ACTIVE | Noted: 2021-01-13

## 2021-01-26 PROBLEM — O99.340 PSYCHOLOGICAL DISORDER DURING PREGNANCY: Status: ACTIVE | Noted: 2021-01-13

## 2021-02-02 PROBLEM — A59.01 TRICHOMONAL VAGINITIS IN PREGNANCY: Status: ACTIVE | Noted: 2021-02-02

## 2021-02-02 PROBLEM — O23.599 TRICHOMONAL VAGINITIS IN PREGNANCY: Status: ACTIVE | Noted: 2021-02-02

## 2021-02-12 PROBLEM — O09.512 PRIMIGRAVIDA OF ADVANCED MATERNAL AGE IN SECOND TRIMESTER: Status: ACTIVE | Noted: 2021-01-13

## 2021-03-16 PROBLEM — O99.340 ANXIETY DURING PREGNANCY: Status: ACTIVE | Noted: 2021-01-13

## 2021-03-22 ENCOUNTER — HOSPITAL ENCOUNTER (OUTPATIENT)
Age: 41
Discharge: HOME | End: 2021-03-22
Attending: OBSTETRICS & GYNECOLOGY | Admitting: OBSTETRICS & GYNECOLOGY
Payer: MEDICAID

## 2021-03-22 VITALS
HEART RATE: 79 BPM | DIASTOLIC BLOOD PRESSURE: 62 MMHG | TEMPERATURE: 98.5 F | BODY MASS INDEX: 24.24 KG/M2 | OXYGEN SATURATION: 98 % | RESPIRATION RATE: 16 BRPM | WEIGHT: 142 LBS | SYSTOLIC BLOOD PRESSURE: 106 MMHG | HEIGHT: 64 IN

## 2021-03-22 PROBLEM — O36.8120 DECREASED FETAL MOVEMENT AFFECTING MANAGEMENT OF PREGNANCY IN SECOND TRIMESTER: Status: ACTIVE | Noted: 2021-03-22

## 2021-03-22 LAB
GLUCOSE, GLUUPC: NEGATIVE
KETONES UR-MCNC: NEGATIVE MG/DL
PROT UR QL: NEGATIVE

## 2021-03-22 PROCEDURE — 99282 EMERGENCY DEPT VISIT SF MDM: CPT

## 2021-03-22 PROCEDURE — 76815 OB US LIMITED FETUS(S): CPT

## 2021-03-22 PROCEDURE — 81002 URINALYSIS NONAUTO W/O SCOPE: CPT | Performed by: OBSTETRICS & GYNECOLOGY

## 2021-03-22 NOTE — DISCHARGE INSTRUCTIONS
Patient Education        Weeks 18 to 22 of Your Pregnancy: Care Instructions  Your Care Instructions     Your baby is continuing to develop quickly. At this stage, babies can now suck their thumbs,  firmly with their hands, and open and close their eyelids. Sometime between 18 and 22 weeks, you will start to feel your baby move. At first, these small fetal movements feel like fluttering or \"butterflies. \" Some women say that they feel like gas bubbles. As the baby grows, these movements will become stronger. You may also notice that your baby kicks and hiccups. During this time, you may find that your nausea and fatigue are gone. Overall, you may feel better and have more energy than you did in your first trimester. But you may also have new discomforts now, such as sleep problems or leg cramps. This care sheet can help you ease these discomforts. Follow-up care is a key part of your treatment and safety. Be sure to make and go to all appointments, and call your doctor if you are having problems. It's also a good idea to know your test results and keep a list of the medicines you take. How can you care for yourself at home? Ease sleep problems  · Avoid caffeine in drinks or chocolate late in the day. · Get some exercise every day. · Take a warm shower or bath before bed. · Have a light snack or glass of milk at bedtime. · Do relaxation exercises in bed to calm your mind and body. · Support your legs and back with extra pillows. Try a pillow between your legs if you sleep on your side. · Do not use sleeping pills or alcohol. They could harm your baby. Ease leg cramps  · Do not massage your calf during the cramp. · Sit on a firm bed or chair. Straighten your leg, and bend your foot (flex your ankle) slowly upward, toward your knee. Bend your toes up and down. · Stand on a cool, flat surface. Stretch your toes upward, and take small steps walking on your heels.   · Use a heating pad or hot water bottle to help with muscle ache. Prevent leg cramps  · Be sure to get enough calcium. If you are worried that you are not getting enough, talk to your doctor. · Exercise every day, and stretch your legs before bed. · Take a warm bath before bed, and try leg warmers at night. Where can you learn more? Go to http://www.gray.com/  Enter Z795 in the search box to learn more about \"Weeks 18 to 22 of Your Pregnancy: Care Instructions. \"  Current as of: February 11, 2020               Content Version: 12.6  © 7527-1582 [employer]. Care instructions adapted under license by [employer] (which disclaims liability or warranty for this information). If you have questions about a medical condition or this instruction, always ask your healthcare professional. [address] any warranty or liability for your use of this information.

## 2021-03-22 NOTE — PROGRESS NOTES
Discharge instructions reviewed, signed and pt given copies. Instructed when to call or come back for further symptoms. Pt discharged in stable condition.

## 2021-03-22 NOTE — PROGRESS NOTES
3/22/2021      RE: Mayda Elias      To Whom it May Concern: This is to certify that Mayda Elias was seen today, 3/22/21 @ [facility]. Thank you for your assistance in this matter.     Sincerely,      [facility]  Dr. Deejay Granados [address] [place] [address]  [phone]
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
## 2021-04-15 PROBLEM — O36.8120 DECREASED FETAL MOVEMENT AFFECTING MANAGEMENT OF PREGNANCY IN SECOND TRIMESTER: Status: RESOLVED | Noted: 2021-03-22 | Resolved: 2021-04-15

## 2021-05-11 PROBLEM — O09.513 PRIMIGRAVIDA OF ADVANCED MATERNAL AGE IN THIRD TRIMESTER: Status: ACTIVE | Noted: 2021-01-13

## 2021-06-07 PROBLEM — Z3A.32 32 WEEKS GESTATION OF PREGNANCY: Status: ACTIVE | Noted: 2021-06-07

## 2021-06-09 ENCOUNTER — TELEPHONE (OUTPATIENT)
Dept: CASE MANAGEMENT | Age: 41
End: 2021-06-09

## 2021-06-09 NOTE — TELEPHONE ENCOUNTER
Referral received from [facility]. Phone call placed to patient at [phone]. No answer; message left.     LEBRON Lyman  [facility]   [phone]

## 2021-06-10 ENCOUNTER — TELEPHONE (OUTPATIENT)
Dept: CASE MANAGEMENT | Age: 41
End: 2021-06-10
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
## 2021-06-11 PROBLEM — Z3A.32 32 WEEKS GESTATION OF PREGNANCY: Status: RESOLVED | Noted: 2021-06-07 | Resolved: 2021-06-11

## 2021-06-18 ENCOUNTER — TELEPHONE (OUTPATIENT)
Dept: CASE MANAGEMENT | Age: 41
End: 2021-06-18

## 2021-07-01 ENCOUNTER — HOSPITAL ENCOUNTER (OUTPATIENT)
Age: 41
Discharge: HOME | End: 2021-07-01
Attending: OBSTETRICS & GYNECOLOGY | Admitting: OBSTETRICS & GYNECOLOGY
Payer: MEDICAID

## 2021-07-01 VITALS
RESPIRATION RATE: 16 BRPM | HEART RATE: 72 BPM | SYSTOLIC BLOOD PRESSURE: 118 MMHG | BODY MASS INDEX: 26.8 KG/M2 | HEIGHT: 64 IN | TEMPERATURE: 98.2 F | DIASTOLIC BLOOD PRESSURE: 74 MMHG | WEIGHT: 157 LBS

## 2021-07-01 PROBLEM — O09.513 ADVANCED MATERNAL AGE, PRIMIGRAVIDA, THIRD TRIMESTER: Status: ACTIVE | Noted: 2021-07-01

## 2021-07-01 PROBLEM — Z3A.35 35 WEEKS GESTATION OF PREGNANCY: Status: ACTIVE | Noted: 2021-07-01

## 2021-07-01 PROBLEM — R03.0 ELEVATED BP WITHOUT DIAGNOSIS OF HYPERTENSION: Status: ACTIVE | Noted: 2021-07-01

## 2021-07-01 LAB
ALBUMIN SERPL-MCNC: 2.8 G/DL (ref 3.5–5)
ALBUMIN/GLOB SERPL: 0.8 {RATIO} (ref 1.2–3.5)
ALP SERPL-CCNC: 100 U/L (ref 50–136)
ALT SERPL-CCNC: 21 U/L (ref 12–65)
ANION GAP SERPL CALC-SCNC: 8 MMOL/L (ref 7–16)
AST SERPL-CCNC: 15 U/L (ref 15–37)
BASOPHILS # BLD: 0.1 K/UL (ref 0–0.2)
BASOPHILS NFR BLD: 0 % (ref 0–2)
BILIRUB SERPL-MCNC: 0.2 MG/DL (ref 0.2–1.1)
BUN SERPL-MCNC: 10 MG/DL (ref 6–23)
CALCIUM SERPL-MCNC: 9.1 MG/DL (ref 8.3–10.4)
CHLORIDE SERPL-SCNC: 108 MMOL/L (ref 98–107)
CO2 SERPL-SCNC: 21 MMOL/L (ref 21–32)
CREAT SERPL-MCNC: 0.55 MG/DL (ref 0.6–1)
CREAT UR-MCNC: 17 MG/DL
DIFFERENTIAL METHOD BLD: ABNORMAL
EOSINOPHIL # BLD: 0.1 K/UL (ref 0–0.8)
EOSINOPHIL NFR BLD: 1 % (ref 0.5–7.8)
ERYTHROCYTE [DISTWIDTH] IN BLOOD BY AUTOMATED COUNT: 13.2 % (ref 11.9–14.6)
GLOBULIN SER CALC-MCNC: 3.6 G/DL (ref 2.3–3.5)
GLUCOSE SERPL-MCNC: 80 MG/DL (ref 65–100)
GLUCOSE, GLUUPC: NEGATIVE
HCT VFR BLD AUTO: 33.8 % (ref 35.8–46.3)
HGB BLD-MCNC: 11.6 G/DL (ref 11.7–15.4)
IMM GRANULOCYTES # BLD AUTO: 0.1 K/UL (ref 0–0.5)
IMM GRANULOCYTES NFR BLD AUTO: 1 % (ref 0–5)
KETONES UR-MCNC: NEGATIVE MG/DL
LDH SERPL L TO P-CCNC: 203 U/L (ref 100–190)
LYMPHOCYTES # BLD: 2.1 K/UL (ref 0.5–4.6)
LYMPHOCYTES NFR BLD: 19 % (ref 13–44)
MCH RBC QN AUTO: 33 PG (ref 26.1–32.9)
MCHC RBC AUTO-ENTMCNC: 34.3 G/DL (ref 31.4–35)
MCV RBC AUTO: 96 FL (ref 79.6–97.8)
MONOCYTES # BLD: 0.7 K/UL (ref 0.1–1.3)
MONOCYTES NFR BLD: 6 % (ref 4–12)
NEUTS SEG # BLD: 8.2 K/UL (ref 1.7–8.2)
NEUTS SEG NFR BLD: 73 % (ref 43–78)
NRBC # BLD: 0 K/UL (ref 0–0.2)
PLATELET # BLD AUTO: 178 K/UL (ref 150–450)
PMV BLD AUTO: 10.8 FL (ref 9.4–12.3)
POTASSIUM SERPL-SCNC: 3.5 MMOL/L (ref 3.5–5.1)
PROT SERPL-MCNC: 6.4 G/DL (ref 6.3–8.2)
PROT UR QL: NEGATIVE
PROT UR-MCNC: <5 MG/DL
PROT/CREAT UR-RTO: NORMAL
RBC # BLD AUTO: 3.52 M/UL (ref 4.05–5.2)
SODIUM SERPL-SCNC: 137 MMOL/L (ref 136–145)
URATE SERPL-MCNC: 4.4 MG/DL (ref 2.6–6)
WBC # BLD AUTO: 11.2 K/UL (ref 4.3–11.1)

## 2021-07-01 PROCEDURE — 84156 ASSAY OF PROTEIN URINE: CPT

## 2021-07-01 PROCEDURE — 85025 COMPLETE CBC W/AUTO DIFF WBC: CPT

## 2021-07-01 PROCEDURE — 80053 COMPREHEN METABOLIC PANEL: CPT

## 2021-07-01 PROCEDURE — 59025 FETAL NON-STRESS TEST: CPT

## 2021-07-01 PROCEDURE — 36415 COLL VENOUS BLD VENIPUNCTURE: CPT

## 2021-07-01 PROCEDURE — 81002 URINALYSIS NONAUTO W/O SCOPE: CPT | Performed by: OBSTETRICS & GYNECOLOGY

## 2021-07-01 PROCEDURE — 83615 LACTATE (LD) (LDH) ENZYME: CPT

## 2021-07-01 PROCEDURE — 84550 ASSAY OF BLOOD/URIC ACID: CPT

## 2021-07-01 PROCEDURE — 99283 EMERGENCY DEPT VISIT LOW MDM: CPT

## 2021-07-01 NOTE — PROGRESS NOTES
CORKY    Work/School Note    Date: 7/1/2021    To Whom It May concern:      Indra He was seen and treated today in the emergency room by the following provider(s):  Attending Provider: Rufus Roberson MD.    Rosalba Oro MD    Indra He is excused from work/school on 07/01/21. She is clear to return to work/school on 07/02/21.         Sincerely,          Hesham Love MD

## 2021-07-01 NOTE — ED PROVIDER NOTES
Chief Complaint: edema and elevated bp at work      39 y.o. female G1 at 35w3d  weeks gestation who is seen for edema and elevated bp at work. .  Pt reports that \"I feel swollen all over. \" Someone at work checked her bp with a wrist cuff and it was 211/114. Pt denies headache, denies ruq pain, denies feeling any contractions. No vag discharge or bleeding. Good fetal movement. HISTORY:    Social History     Substance and Sexual Activity   Sexual Activity Yes    Partners: Male    Birth control/protection: None     Patient's last menstrual period was 11/15/2020. Social History     Socioeconomic History    Marital status: SINGLE     Spouse name: Not on file    Number of children: Not on file    Years of education: Not on file    Highest education level: Not on file   Occupational History    Not on file   Tobacco Use    Smoking status: Current Every Day Smoker     Packs/day: 0.25     Types: Cigarettes    Smokeless tobacco: Never Used   Substance and Sexual Activity    Alcohol use: Not Currently     Comment: rare    Drug use: No     Comment: 2002-rehab for opiate addiction    Sexual activity: Yes     Partners: Male     Birth control/protection: None   Other Topics Concern    Not on file   Social History Narrative    Not on file     Social Determinants of Health     Financial Resource Strain:     Difficulty of Paying Living Expenses:    Food Insecurity:     Worried About Running Out of Food in the Last Year:     Ran Out of Food in the Last Year:    Transportation Needs:     Lack of Transportation (Medical):      Lack of Transportation (Non-Medical):    Physical Activity:     Days of Exercise per Week:     Minutes of Exercise per Session:    Stress:     Feeling of Stress :    Social Connections:     Frequency of Communication with Friends and Family:     Frequency of Social Gatherings with Friends and Family:     Attends Faith Services:     Active Member of Clubs or Organizations:     Attends Club or Organization Meetings:     Marital Status:    Intimate Partner Violence:     Fear of Current or Ex-Partner:     Emotionally Abused:     Physically Abused:     Sexually Abused:        Past Surgical History:   Procedure Laterality Date    HX ADENOIDECTOMY      at age 11   Vel[place] Paulo WISDOM TEETH EXTRACTION         Past Medical History:   Diagnosis Date    Borderline personality disorder ([facility] [address]     Chlamydia     as a teen    Depression     GERD (gastroesophageal reflux disease)     Trichomonal vaginitis in pregnancy 2/2/2021         ROS:  A 12 point review of symptoms negative except for chief complaint as described above. PHYSICAL EXAM:  Blood pressure 127/76, pulse 74, temperature 98.2 °F (36.8 °C), resp. rate 16, height 5' 4\" (1.626 m), weight 71.2 kg (157 lb), last menstrual period 11/15/2020. Pt has 3 bp's while in triage and all were very normal     Constitutional: The patient appears well, alert, oriented x 3. Cardiovascular: Heart RRR, no murmurs.    Respiratory: Lungs clear, no respiratory distress  GI: Abdomen soft, nontender, no guarding  No fundal tenderness  Musculoskeletal: no cva tenderness  Upper ext: no edema, reflexes +2  Lower ext: +1 edema, neg edita's, reflexes +2  Skin: no rashes or lesions  Psychiatric:Mood/ Affect: appropriate  Genitourinary: SVE:cl/60/-2  FHT:reactive, cat 1  TOCO: irreg (pt not feeling)    Recent Results (from the past 12 hour(s))   POC URINE DIPSTICK MANUAL    Collection Time: 07/01/21 12:30 PM   Result Value Ref Range    Protein (POC) Negative Negative    Glucose, urine (POC) Negative Negative    Ketones (POC) Negative Negative   CBC WITH AUTOMATED DIFF    Collection Time: 07/01/21  1:19 PM   Result Value Ref Range    WBC 11.2 (H) 4.3 - 11.1 K/uL    RBC 3.52 (L) 4.05 - 5.2 M/uL    HGB 11.6 (L) 11.7 - 15.4 g/dL    HCT 33.8 (L) 35.8 - 46.3 %    MCV 96.0 79.6 - 97.8 FL    MCH 33.0 (H) 26.1 - 32.9 PG    MCHC 34.3 31.4 - 35.0 g/dL    RDW 13.2 11.9 - 14.6 %    PLATELET 287 255 - 769 K/uL    MPV 10.8 9.4 - 12.3 FL    ABSOLUTE NRBC 0.00 0.0 - 0.2 K/uL    DF AUTOMATED      NEUTROPHILS 73 43 - 78 %    LYMPHOCYTES 19 13 - 44 %    MONOCYTES 6 4.0 - 12.0 %    EOSINOPHILS 1 0.5 - 7.8 %    BASOPHILS 0 0.0 - 2.0 %    IMMATURE GRANULOCYTES 1 0.0 - 5.0 %    ABS. NEUTROPHILS 8.2 1.7 - 8.2 K/UL    ABS. LYMPHOCYTES 2.1 0.5 - 4.6 K/UL    ABS. MONOCYTES 0.7 0.1 - 1.3 K/UL    ABS. EOSINOPHILS 0.1 0.0 - 0.8 K/UL    ABS. BASOPHILS 0.1 0.0 - 0.2 K/UL    ABS. IMM. GRANS. 0.1 0.0 - 0.5 K/UL   METABOLIC PANEL, COMPREHENSIVE    Collection Time: 07/01/21  1:19 PM   Result Value Ref Range    Sodium 137 136 - 145 mmol/L    Potassium 3.5 3.5 - 5.1 mmol/L    Chloride 108 (H) 98 - 107 mmol/L    CO2 21 21 - 32 mmol/L    Anion gap 8 7 - 16 mmol/L    Glucose 80 65 - 100 mg/dL    BUN 10 6 - 23 MG/DL    Creatinine 0.55 (L) 0.6 - 1.0 MG/DL    GFR est AA >60 >60 ml/min/1.73m2    GFR est non-AA >60 >60 ml/min/1.73m2    Calcium 9.1 8.3 - 10.4 MG/DL    Bilirubin, total 0.2 0.2 - 1.1 MG/DL    ALT (SGPT) 21 12 - 65 U/L    AST (SGOT) 15 15 - 37 U/L    Alk. phosphatase 100 50 - 136 U/L    Protein, total 6.4 6.3 - 8.2 g/dL    Albumin 2.8 (L) 3.5 - 5.0 g/dL    Globulin 3.6 (H) 2.3 - 3.5 g/dL    A-G Ratio 0.8 (L) 1.2 - 3.5     LD    Collection Time: 07/01/21  1:19 PM   Result Value Ref Range     (H) 100 - 190 U/L   URIC ACID    Collection Time: 07/01/21  1:19 PM   Result Value Ref Range    Uric acid 4.4 2.6 - 6.0 MG/DL   PROTEIN/CREATININE RATIO, URINE    Collection Time: 07/01/21  1:20 PM   Result Value Ref Range    Protein, urine random <5 mg/dL    Creatinine, urine 17.00 mg/dL    Protein/Creat. urine Ratio        Unable to perform calculation due to low spot urine.        I personally reviewed pt's medical record including relevant labs and ultrasounds  I reviewed the NST at today's encounter    Assessment/Plan:    38 yo G1 at 35w3d with elevated bp at work  preg complicated by advanced maternal age, smoking, hx of psych  Here in triage normal bp X 3  Reactive nst  Labs as above  Home with reassurance; keep appt as scheduled next Tuesday    Clinical impression: elevated bp without diagnosis of htn  Advanced maternal age primigravid third trimester    Medical decision making c/w level of care

## 2021-07-01 NOTE — PROGRESS NOTES
Pt discharged to home in stable condition after given both written and verbal discharge instructions. No questions or concerns noted. Pt ambulated off unit in stable condition.

## 2021-07-01 NOTE — DISCHARGE INSTRUCTIONS
High Blood Pressure in Pregnancy: Care Instructions  Your Care Instructions     High blood pressure (hypertension) means that the force of blood against your artery walls is too strong. Mild high blood pressure during pregnancy is not usually dangerous. Your doctor will probably just want to watch you closely. But when blood pressure is very high, it can reduce oxygen to your baby. This can affect how well your baby grows. High blood pressure also means that you are at higher risk for:  · Preeclampsia. This is a problem that includes high blood pressure and damage to your liver or kidneys. It can also reduce how much oxygen your baby gets. In some cases, it leads to eclampsia. Eclampsia causes seizures. · Placental abruption. This is a problem when the placenta separates from the uterus before birth. It prevents the baby from getting enough oxygen and nutrients. Sometimes it can cause death for the baby and the mother. To prevent problems for you or your baby, you will have to check your blood pressure often. You will do this until after your baby is born. If your blood pressure rises suddenly or is very high during your pregnancy, your doctor may prescribe medicines. They can usually control blood pressure. If your blood pressure affects your or your baby's health, your doctor may need to deliver your baby early. After your baby is born, your blood pressure will probably improve. But sometimes blood pressure problems continue after birth. Follow-up care is a key part of your treatment and safety. Be sure to make and go to all appointments, and call your doctor if you are having problems. It's also a good idea to know your test results and keep a list of the medicines you take. How can you care for yourself at home? · Take and write down your blood pressure at home if your doctor says to. · Take your medicines exactly as prescribed.  Call your doctor if you think you are having a problem with your medicine. · Do not smoke. This is one of the best things you can do to help your baby be healthy. If you need help quitting, talk to your doctor about stop-smoking programs and medicines. These can increase your chances of quitting for good. · Don't gain too much weight during pregnancy. Talk to your doctor about how much weight gain is healthy. · Eat a healthy diet. · If your doctor says it's okay, get regular exercise. Walking or swimming several times a week can be healthy for you and your baby. · Reduce stress, and find time to relax. This is very important if you continue to work or have a busy schedule. It's also important if you have small children at home. When should you call for help? Call 911 anytime you think you may need emergency care. For example, call if:    · You passed out (lost consciousness).     · You have a seizure. Call your doctor now or seek immediate medical care if:    · You have symptoms of preeclampsia, such as:  ? Sudden swelling of your face, hands, or feet. ? New vision problems (such as dimness, blurring, or seeing spots). ? A severe headache.     · Your blood pressure is higher than it should be or rises suddenly.     · You have new nausea or vomiting.     · You think that you are in labor.     · You have pain in your belly or pelvis. Watch closely for changes in your health, and be sure to contact your doctor if:    · You gain weight rapidly. Where can you learn more? Go to http://www.gray.com/  Enter A052 in the search box to learn more about \"High Blood Pressure in Pregnancy: Care Instructions. \"  Current as of: October 8, 2020               Content Version: 12.8  © 2702-1062 [employer]. Care instructions adapted under license by [employer] (which disclaims liability or warranty for this information).  If you have questions about a medical condition or this instruction, always ask your healthcare professional. [employer] disclaims any warranty or liability for your use of this information.

## 2021-07-01 NOTE — PROGRESS NOTES
Pt admitted to [facility] ED for complaints of swelling and HTN. BP WNL. MD notified. SVE: closed.  LABS drawn

## 2021-07-19 ENCOUNTER — ANESTHESIA (OUTPATIENT)
Dept: LABOR AND DELIVERY | Age: 41
DRG: 560 | End: 2021-07-19
Payer: MEDICAID

## 2021-07-19 ENCOUNTER — ANESTHESIA EVENT (OUTPATIENT)
Dept: LABOR AND DELIVERY | Age: 41
DRG: 560 | End: 2021-07-19
Payer: MEDICAID

## 2021-07-19 ENCOUNTER — HOSPITAL ENCOUNTER (INPATIENT)
Age: 41
LOS: 2 days | Discharge: HOME | DRG: 560 | End: 2021-07-21
Attending: OBSTETRICS & GYNECOLOGY | Admitting: OBSTETRICS & GYNECOLOGY
Payer: MEDICAID

## 2021-07-19 DIAGNOSIS — Z37.9 NORMAL LABOR: ICD-10-CM

## 2021-07-19 PROBLEM — R10.9 ABDOMINAL PAIN DURING PREGNANCY IN THIRD TRIMESTER: Status: ACTIVE | Noted: 2021-07-19

## 2021-07-19 PROBLEM — O26.893 ABDOMINAL PAIN DURING PREGNANCY IN THIRD TRIMESTER: Status: ACTIVE | Noted: 2021-07-19

## 2021-07-19 LAB
ABO + RH BLD: NORMAL
BASE EXCESS BLD CALC-SCNC: 0.6 MMOL/L
BASE EXCESS BLD CALC-SCNC: 3.1 MMOL/L
BLOOD GROUP ANTIBODIES SERPL: NORMAL
ERYTHROCYTE [DISTWIDTH] IN BLOOD BY AUTOMATED COUNT: 12.9 % (ref 11.9–14.6)
HCO3 BLD-SCNC: 30.6 MMOL/L (ref 22–26)
HCO3 BLDV-SCNC: 25.5 MMOL/L (ref 23–28)
HCT VFR BLD AUTO: 37.9 % (ref 35.8–46.3)
HGB BLD-MCNC: 13.2 G/DL (ref 11.7–15.4)
MCH RBC QN AUTO: 33.2 PG (ref 26.1–32.9)
MCHC RBC AUTO-ENTMCNC: 34.8 G/DL (ref 31.4–35)
MCV RBC AUTO: 95.5 FL (ref 79.6–97.8)
NRBC # BLD: 0 K/UL (ref 0–0.2)
PCO2 BLDCO: 41 MMHG (ref 32–68)
PCO2 BLDCO: 56 MMHG (ref 32–68)
PH BLDCO: 7.34 [PH] (ref 7.15–7.38)
PH BLDCO: 7.4 [PH] (ref 7.15–7.38)
PLATELET # BLD AUTO: 160 K/UL (ref 150–450)
PMV BLD AUTO: 11.3 FL (ref 9.4–12.3)
PO2 BLDCO: 18 MMHG
PO2 BLDCO: <5 MMHG
RBC # BLD AUTO: 3.97 M/UL (ref 4.05–5.2)
SAO2 % BLDV: 26 % (ref 65–88)
SERVICE CMNT-IMP: ABNORMAL
SERVICE CMNT-IMP: ABNORMAL
SPECIMEN EXP DATE BLD: NORMAL
SPECIMEN TYPE: ABNORMAL
SPECIMEN TYPE: ABNORMAL
WBC # BLD AUTO: 14 K/UL (ref 4.3–11.1)

## 2021-07-19 PROCEDURE — 0HQ9XZZ REPAIR PERINEUM SKIN, EXTERNAL APPROACH: ICD-10-PCS | Performed by: OBSTETRICS & GYNECOLOGY

## 2021-07-19 PROCEDURE — 74011250636 HC RX REV CODE- 250/636: Performed by: OBSTETRICS & GYNECOLOGY

## 2021-07-19 PROCEDURE — 77030018846 HC SOL IRR STRL H20 ICUM -A

## 2021-07-19 PROCEDURE — 82803 BLOOD GASES ANY COMBINATION: CPT

## 2021-07-19 PROCEDURE — 74011250636 HC RX REV CODE- 250/636: Performed by: ANESTHESIOLOGY

## 2021-07-19 PROCEDURE — 76060000078 HC EPIDURAL ANESTHESIA

## 2021-07-19 PROCEDURE — A4300 CATH IMPL VASC ACCESS PORTAL: HCPCS | Performed by: ANESTHESIOLOGY

## 2021-07-19 PROCEDURE — 85027 COMPLETE CBC AUTOMATED: CPT

## 2021-07-19 PROCEDURE — 74011000250 HC RX REV CODE- 250: Performed by: ANESTHESIOLOGY

## 2021-07-19 PROCEDURE — 75410000000 HC DELIVERY VAGINAL/SINGLE

## 2021-07-19 PROCEDURE — 75410000002 HC LABOR FEE PER 1 HR

## 2021-07-19 PROCEDURE — 77030003028 HC SUT VCRL J&J -A

## 2021-07-19 PROCEDURE — 77030007880 HC KT SPN EPDRL BBMI -B: Performed by: ANESTHESIOLOGY

## 2021-07-19 PROCEDURE — 86901 BLOOD TYPING SEROLOGIC RH(D): CPT

## 2021-07-19 PROCEDURE — 59409 OBSTETRICAL CARE: CPT | Performed by: OBSTETRICS & GYNECOLOGY

## 2021-07-19 PROCEDURE — 99283 EMERGENCY DEPT VISIT LOW MDM: CPT | Performed by: OBSTETRICS & GYNECOLOGY

## 2021-07-19 PROCEDURE — 74011250637 HC RX REV CODE- 250/637: Performed by: OBSTETRICS & GYNECOLOGY

## 2021-07-19 PROCEDURE — 65270000029 HC RM PRIVATE

## 2021-07-19 PROCEDURE — 2709999900 HC NON-CHARGEABLE SUPPLY

## 2021-07-19 PROCEDURE — 77030011945 HC CATH URIN INT ST MENT -A

## 2021-07-19 PROCEDURE — 77030014125 HC TY EPDRL BBMI -B: Performed by: ANESTHESIOLOGY

## 2021-07-19 PROCEDURE — 75410000003 HC RECOV DEL/VAG/CSECN EA 0.5 HR

## 2021-07-19 RX ORDER — IBUPROFEN 800 MG/1
800 TABLET ORAL
Status: DISCONTINUED | OUTPATIENT
Start: 2021-07-19 | End: 2021-07-21 | Stop reason: HOSPADM

## 2021-07-19 RX ORDER — MINERAL OIL
120 OIL (ML) ORAL
Status: COMPLETED | OUTPATIENT
Start: 2021-07-19 | End: 2021-07-19

## 2021-07-19 RX ORDER — SIMETHICONE 80 MG
80 TABLET,CHEWABLE ORAL
Status: DISCONTINUED | OUTPATIENT
Start: 2021-07-19 | End: 2021-07-21 | Stop reason: HOSPADM

## 2021-07-19 RX ORDER — SODIUM CHLORIDE 0.9 % (FLUSH) 0.9 %
5-40 SYRINGE (ML) INJECTION EVERY 8 HOURS
Status: DISCONTINUED | OUTPATIENT
Start: 2021-07-19 | End: 2021-07-19 | Stop reason: HOSPADM

## 2021-07-19 RX ORDER — ROPIVACAINE HYDROCHLORIDE 2 MG/ML
INJECTION, SOLUTION EPIDURAL; INFILTRATION; PERINEURAL AS NEEDED
Status: DISCONTINUED | OUTPATIENT
Start: 2021-07-19 | End: 2021-07-19 | Stop reason: HOSPADM

## 2021-07-19 RX ORDER — LIDOCAINE HYDROCHLORIDE AND EPINEPHRINE 15; 5 MG/ML; UG/ML
INJECTION, SOLUTION EPIDURAL AS NEEDED
Status: DISCONTINUED | OUTPATIENT
Start: 2021-07-19 | End: 2021-07-19 | Stop reason: HOSPADM

## 2021-07-19 RX ORDER — ACETAMINOPHEN 500 MG
1000 TABLET ORAL
Status: DISCONTINUED | OUTPATIENT
Start: 2021-07-19 | End: 2021-07-21 | Stop reason: HOSPADM

## 2021-07-19 RX ORDER — DEXTROSE, SODIUM CHLORIDE, SODIUM LACTATE, POTASSIUM CHLORIDE, AND CALCIUM CHLORIDE 5; .6; .31; .03; .02 G/100ML; G/100ML; G/100ML; G/100ML; G/100ML
125 INJECTION, SOLUTION INTRAVENOUS CONTINUOUS
Status: DISCONTINUED | OUTPATIENT
Start: 2021-07-19 | End: 2021-07-19 | Stop reason: HOSPADM

## 2021-07-19 RX ORDER — SODIUM CHLORIDE 0.9 % (FLUSH) 0.9 %
5-40 SYRINGE (ML) INJECTION AS NEEDED
Status: DISCONTINUED | OUTPATIENT
Start: 2021-07-19 | End: 2021-07-19 | Stop reason: HOSPADM

## 2021-07-19 RX ORDER — OXYTOCIN/RINGER'S LACTATE 30/500 ML
87.3 PLASTIC BAG, INJECTION (ML) INTRAVENOUS AS NEEDED
Status: DISCONTINUED | OUTPATIENT
Start: 2021-07-19 | End: 2021-07-19 | Stop reason: HOSPADM

## 2021-07-19 RX ORDER — OXYTOCIN/RINGER'S LACTATE 30/500 ML
0-20 PLASTIC BAG, INJECTION (ML) INTRAVENOUS
Status: DISCONTINUED | OUTPATIENT
Start: 2021-07-19 | End: 2021-07-19

## 2021-07-19 RX ORDER — OXYCODONE HYDROCHLORIDE 5 MG/1
5 TABLET ORAL
Status: DISCONTINUED | OUTPATIENT
Start: 2021-07-19 | End: 2021-07-21 | Stop reason: HOSPADM

## 2021-07-19 RX ORDER — BUTORPHANOL TARTRATE 2 MG/ML
1 INJECTION INTRAMUSCULAR; INTRAVENOUS
Status: DISCONTINUED | OUTPATIENT
Start: 2021-07-19 | End: 2021-07-19 | Stop reason: HOSPADM

## 2021-07-19 RX ORDER — OXYTOCIN/RINGER'S LACTATE 30/500 ML
10 PLASTIC BAG, INJECTION (ML) INTRAVENOUS AS NEEDED
Status: DISCONTINUED | OUTPATIENT
Start: 2021-07-19 | End: 2021-07-19 | Stop reason: HOSPADM

## 2021-07-19 RX ORDER — LIDOCAINE HYDROCHLORIDE 10 MG/ML
1 INJECTION INFILTRATION; PERINEURAL
Status: DISCONTINUED | OUTPATIENT
Start: 2021-07-19 | End: 2021-07-19 | Stop reason: HOSPADM

## 2021-07-19 RX ORDER — ROPIVACAINE HYDROCHLORIDE 2 MG/ML
INJECTION, SOLUTION EPIDURAL; INFILTRATION; PERINEURAL
Status: DISCONTINUED | OUTPATIENT
Start: 2021-07-19 | End: 2021-07-19 | Stop reason: HOSPADM

## 2021-07-19 RX ORDER — LIDOCAINE HYDROCHLORIDE 20 MG/ML
JELLY TOPICAL
Status: DISCONTINUED | OUTPATIENT
Start: 2021-07-19 | End: 2021-07-19 | Stop reason: HOSPADM

## 2021-07-19 RX ORDER — ONDANSETRON 2 MG/ML
4 INJECTION INTRAMUSCULAR; INTRAVENOUS
Status: DISCONTINUED | OUTPATIENT
Start: 2021-07-19 | End: 2021-07-19 | Stop reason: HOSPADM

## 2021-07-19 RX ADMIN — ROPIVACAINE HYDROCHLORIDE 4 ML: 2 INJECTION, SOLUTION EPIDURAL; INFILTRATION at 08:10

## 2021-07-19 RX ADMIN — ROPIVACAINE HYDROCHLORIDE 4 ML: 2 INJECTION, SOLUTION EPIDURAL; INFILTRATION at 08:12

## 2021-07-19 RX ADMIN — LIDOCAINE HYDROCHLORIDE,EPINEPHRINE BITARTRATE 3 ML: 15; .005 INJECTION, SOLUTION EPIDURAL; INFILTRATION; INTRACAUDAL; PERINEURAL at 08:07

## 2021-07-19 RX ADMIN — SODIUM CHLORIDE, SODIUM LACTATE, POTASSIUM CHLORIDE, CALCIUM CHLORIDE, AND DEXTROSE MONOHYDRATE 125 ML/HR: 600; 310; 30; 20; 5 INJECTION, SOLUTION INTRAVENOUS at 07:37

## 2021-07-19 RX ADMIN — IBUPROFEN 800 MG: 800 TABLET, FILM COATED ORAL at 21:57

## 2021-07-19 RX ADMIN — WITCH HAZEL 1 PAD: 500 SOLUTION RECTAL; TOPICAL at 14:01

## 2021-07-19 RX ADMIN — ROPIVACAINE HYDROCHLORIDE 10 ML/HR: 2 INJECTION, SOLUTION EPIDURAL; INFILTRATION; PERINEURAL at 08:12

## 2021-07-19 RX ADMIN — ROPIVACAINE HYDROCHLORIDE 2 ML: 2 INJECTION, SOLUTION EPIDURAL; INFILTRATION at 08:08

## 2021-07-19 RX ADMIN — Medication 2 MILLI-UNITS/MIN: at 09:18

## 2021-07-19 RX ADMIN — IBUPROFEN 800 MG: 800 TABLET, FILM COATED ORAL at 14:01

## 2021-07-19 RX ADMIN — MINERAL OIL 120 ML: 1000 LIQUID ORAL at 10:34

## 2021-07-19 RX ADMIN — SODIUM CHLORIDE, SODIUM LACTATE, POTASSIUM CHLORIDE, AND CALCIUM CHLORIDE 500 ML: 600; 310; 30; 20 INJECTION, SOLUTION INTRAVENOUS at 07:37

## 2021-07-19 NOTE — PROGRESS NOTES
Patient presents to triage from home with complaints of labor that started around 0400. Reports good fetal movement. Some bloody show.

## 2021-07-19 NOTE — PROGRESS NOTES
1158 Dr Elin Hodgkins here for epidural. Pt sitting up.   0800 time out  0807 Test dose  0811  Pt to left tilt

## 2021-07-19 NOTE — ANESTHESIA PROCEDURE NOTES
CSE Block    Start time: 7/19/2021 7:57 AM  End time: 7/19/2021 8:08 AM  Performed by: Sindhu Benitez MD  Authorized by: Sindhu Benitez MD     Pre-Procedure  Indications: at surgeon's request and primary anesthetic    preanesthetic checklist: patient identified, risks and benefits discussed, anesthesia consent, site marked, patient being monitored and timeout performed    Timeout Time: 07:57 EDT        Procedure:   Patient Position:  Seated  Prep Region:  Lumbar  Prep: chlorhexidine    Location:  L3-4    Epidural Needle:   Needle Type:  Tuohy  Needle Gauge:  17 G  Injection Technique:  Loss of resistance using saline  Attempts:  1    Spinal Needle:   Needle Type:  Pencan  Needle Gauge:  25 G    Catheter:   Catheter Type:  Flex-tip  Catheter Size:  19 G  Catheter at Skin Depth (cm):  8  Depth in Epidural Space (cm):  3.5  Events: no blood with aspiration, no cerebrospinal fluid with aspiration, no paresthesia, negative aspiration test and CSF confirmed    Test Dose:  Negative    Assessment:   Catheter Secured:  Tegaderm and tape  Insertion:  Uncomplicated  Patient tolerance:  Patient tolerated the procedure well with no immediate complications

## 2021-07-19 NOTE — PROGRESS NOTES
SBAR OUT Report: Mother    Verbal report given to Lino Funes RN  on this patient, who is now being transferred to MIU (unit) for routine progression of care. The patient is not wearing a green \"Anesthesia-Duramorph\" band. Report consisted of patient's Situation, Background, Assessment and Recommendations (SBAR).  ID bands were compared with the identification form, and verified with the patient and receiving nurse. Information from the SBAR and the [address] [facility] Report was reviewed with the receiving nurse; opportunity for questions and clarification provided.

## 2021-07-19 NOTE — ANESTHESIA POSTPROCEDURE EVALUATION
* No procedures listed *.     CSE    Anesthesia Post Evaluation      Multimodal analgesia: multimodal analgesia used between 6 hours prior to anesthesia start to PACU discharge  Patient location during evaluation: floor  Patient participation: complete - patient participated  Level of consciousness: awake and alert  Pain management: adequate  Airway patency: patent  Anesthetic complications: no  Cardiovascular status: acceptable  Respiratory status: acceptable  Hydration status: acceptable  Post anesthesia nausea and vomiting:  controlled  Final Post Anesthesia Temperature Assessment:  Normothermia (36.0-37.5 degrees C)      INITIAL Post-op Vital signs:   Visit Vitals  /65 (BP 1 Location: Right upper arm, BP Patient Position: At rest)   Pulse 75   Temp 37.4 °C (99.3 °F)   Resp 16   Ht 5' 4\" (1.626 m)   Wt 71.7 kg (158 lb)   SpO2 99%   Breastfeeding Unknown   BMI 27.12 kg/m²

## 2021-07-19 NOTE — ANESTHESIA PREPROCEDURE EVALUATION
Relevant Problems   NEUROLOGY   (+) Anxiety during pregnancy   (+) Psychological disorder during pregnancy       Anesthetic History   No history of anesthetic complications            Review of Systems / Medical History  Patient summary reviewed and pertinent labs reviewed    Pulmonary  Within defined limits                 Neuro/Psych         Psychiatric history     Cardiovascular  Within defined limits                Exercise tolerance: >4 METS     GI/Hepatic/Renal     GERD           Endo/Other  Within defined limits           Other Findings              Physical Exam    Airway  Mallampati: III  TM Distance: 4 - 6 cm  Neck ROM: normal range of motion   Mouth opening: Normal     Cardiovascular    Rhythm: regular  Rate: normal         Dental  No notable dental hx       Pulmonary  Breath sounds clear to auscultation               Abdominal  GI exam deferred       Other Findings            Anesthetic Plan    ASA: 2  Anesthesia type: CSE            Anesthetic plan and risks discussed with: Patient and Family

## 2021-07-19 NOTE — LACTATION NOTE
This note was copied from a baby's chart. Patients received. Mother states she was admitted under breast and bottle feeding. Mother assisted with latch. Unable to latch at this time. Mother requesting baby to be bottle fed. Similac given. Mother bottle fed baby 30 ml without difficulty.

## 2021-07-19 NOTE — H&P
History & Physical    Name: Calista Patel MRN: [id number]  SSN: [id number]    YOB: 1980  Age: 39 y.o. Sex: female      Chief c/o: painful contractions  Subjective:     Estimated Date of Delivery: 21  OB History    Para Term  AB Living   1             SAB TAB Ectopic Molar Multiple Live Births                    # Outcome Date GA Lbr Dale/2nd Weight Sex Delivery Anes PTL Lv   1 Current                Ms. Kuldip Montana is admitted with pregnancy at 38w0d for active labor. Pt reports painful contractions around 4:00 am with srom and blood noted about the same time. No other c/o. Prenatal course was complicated by advanced maternal age and trich during pregnancy. Pt is a smoker. Please see prenatal records for details. Past Medical History:   Diagnosis Date    Borderline personality disorder ([address]     Chlamydia     as a teen    Depression     GERD (gastroesophageal reflux disease)     Trichomonal vaginitis in pregnancy 2021     Past Surgical History:   Procedure Laterality Date    HX ADENOIDECTOMY      at age 11   Cleophus Livers WISDOM TEETH EXTRACTION       Social History     Occupational History    Not on file   Tobacco Use    Smoking status: Current Every Day Smoker     Packs/day: 0.25     Types: Cigarettes    Smokeless tobacco: Never Used   Substance and Sexual Activity    Alcohol use: Not Currently     Comment: rare    Drug use: No     Comment: -rehab for opiate addiction    Sexual activity: Yes     Partners: Male     Birth control/protection: None     Family History   Problem Relation Age of Onset    Hypertension Mother        No Known Allergies  Prior to Admission medications    Medication Sig Start Date End Date Taking? Authorizing Provider   magnesium oxide 420 mg tab Take 1 Tablet by mouth two (2) times a day. 21  Yes Ene Kc MD   diphenhydrAMINE (BenadryL) 25 mg capsule Take 25 mg by mouth every six (6) hours as needed.    Yes Provider, Historical calcium carbonate (CALCIUM 600 PO) Take  by mouth. Yes Provider, Historical   calcium carbonate (TUMS) 200 mg calcium (500 mg) chew Take 1 Tab by mouth daily. Yes Provider, Historical   cholecalciferol, vitamin D3, (Vitamin D3) 50 mcg (2,000 unit) tab Take  by mouth. Yes Provider, Historical   prenatal vit 91/iron/folic/dha (PRENATAL + DHA PO) Take  by mouth. Yes Provider, Historical   loratadine (Claritin) 10 mg tablet Take 10 mg by mouth. Patient not taking: Reported on 7/19/2021    Provider, Historical   aspirin delayed-release 81 mg tablet Take 162 mg by mouth daily. Patient not taking: Reported on 7/19/2021    Provider, Historical        Review of Systems: A comprehensive review of systems was negative except for that written in the HPI. a 12 point review of systems negative except as written. Objective:     Vitals:  Vitals:    07/19/21 0650 07/19/21 0654 07/19/21 0659   BP:  (!) 140/73 (!) 140/73   Pulse:  68 68   Resp:   18   Temp:   98 °F (36.7 °C)   Weight: 71.7 kg (158 lb)     Height: 5' 4\" (1.626 m)          Physical Exam:  Patient without distress except for pain with contractions  Heart: Regular rate and rhythm  Lung: clear to auscultation throughout lung fields, no wheezes, no rales, no rhonchi and normal respiratory effort  Back: costovertebral angle tenderness absent  Abdomen: soft, nontender  Fundus: soft and non tender  Perineum: blood -moderate amount, amniotic fluid present  Cervical Exam: 9 cm dilated    100% effaced    -1 station    Presenting Part: cephalic  Lower Extremities:  - Edema 1+   - Patellar Reflexes: 2+ bilaterally  Skin - no rashes or lesions  Membranes:  Spontaneous Rupture of Membranes;  Amniotic Fluid: bloody fluid  Fetal Heart Rate: Reactive  Baseline: 140 per minute  Variability: moderate  Accelerations: yes  Decelerations: none  Uterine contractions: regular, every 3 minutes    Prenatal Labs:   Lab Results   Component Value Date/Time    Gonorrhea, External negative 01/21/2021 12:00 AM    Chlamydia, External negative 01/21/2021 12:00 AM         Assessment/Plan:     Active Problems:    Advanced maternal age, primigravida, third trimester (7/1/2021)      Abdominal pain during pregnancy in third trimester (7/19/2021)      Normal labor (7/19/2021)         Plan:42 yo G1 at 45 weeks with active labor. Reactive nst .  Admit for Reassuring fetal status, Labor  Progressing normally, Continue plan for vaginal delivery. Group B Strep was negative.  Desires epidural.

## 2021-07-19 NOTE — PROGRESS NOTES
Patient assisted to bathroom. Voided without difficulty. Suri care taught. Patient to call again when she needs to get up to restroom to review. Patient ambulated back to bed unassisted.

## 2021-07-19 NOTE — L&D DELIVERY NOTE
Delivery Summary    Patient: Osorio Chacon MRN: [id number]  SSN: [id number]    YOB: 1980  Age: 39 y.o. Sex: female       Head delivered over intact perineum with delivery of anterior shoulder. Bulb suction of mouth and nose on field. Body delivered through nuchal cord. Nuchal cord reduced. Cord clamped and cut and initially placed on maternal chest and then in the warmer. Placenta expressed with fundus firm and manually explored and noted to be free of placenta. 1st degree bilateral labial  lacerations repaired using 3-0 vicryl with excellent hemostasis. Mother and baby doing well.           Information for the patient's :  Aicha Shah [[id number]]       Labor Events:    Labor: No    Steroids: None   Cervical Ripening Date/Time:       Cervical Ripening Type: None   Antibiotics During Labor: No   Rupture Identifier:      Rupture Date/Time: 2021 1:00 AM   Rupture Type: SROM   Amniotic Fluid Volume: None    Amniotic Fluid Description:      Amniotic Fluid Odor:      Induction: None       Induction Date/Time:        Indications for Induction:      Augmentation: Oxytocin   Augmentation Date/Time: 19:18 AM   Indications for Augmentation: Ineffective Contraction Pattern   Labor complications: None       Additional complications:        Delivery Events:  Indications For Episiotomy:     Episiotomy: None   Perineal Laceration(s): None   Repaired:     Periurethral Laceration Location:      Repaired:     Labial Laceration Location: bilateral   Repaired: Yes   Sulcal Laceration Location:     Repaired:     Vaginal Laceration Location:     Repaired:     Cervical Laceration Location:     Repaired:     Repair Suture: Vicryl 3-0   Number of Repair Packets: 1   Estimated Blood Loss (ml):  ml   Quantitative Blood Loss (ml)                Delivery Date: 2021    Delivery Time: 10:16 AM  Delivery Type: Vaginal, Spontaneous  Sex:  Female    Gestational Age: 42w0d Delivery Clinician:  Amos Campos  Living Status: Living   Delivery Location: L&D 434          APGARS  One minute Five minutes Ten minutes   Skin color: 0   1        Heart rate: 2   2        Grimace: 2   2        Muscle tone: 2   2        Breathin   2        Totals: 8   9            Presentation: Vertex    Position: Middle Occiput Anterior  Resuscitation Method:  Suctioning-bulb     Meconium Stained: None      Cord Information: 3 Vessels  Complications: Nuchal Cord Without Compressions  Cord around: shoulders  Delayed cord clamping? No  Cord clamped date/time:2021 10:17 AM  Disposition of Cord Blood: Lab    Blood Gases Sent?: Yes    Placenta:  Date/Time: 2021 10:22 AM  Removal: Expressed      Appearance: Normal      Measurements:  Birth Weight: 5 lb 13.1 oz (2.64 kg)      Birth Length: 1' 5.72\" (0.45 m)      Head Circumference: 11.61\" (0.295 m)      Chest Circumference: 1' 0.21\" (0.31 m)     Abdominal Girth: Other Providers:   CHERI CAMPOS;CINTIA GARCIA;FLORA LAIRD;, Obstetrician;Primary Nurse;Primary [place] Nurse;Scrub Tech           Group B Strep: No results found for: GRBSEXT, GRBSEXT  Information for the patient's :  Rory Gutiérrez [[id number]]   No results found for: ABORH, PCTABR, PCTDIG, BILI, ABORHEXT, ABORH     No results for input(s): PCO2CB, PO2CB, HCO3I, SO2I, IBD, PTEMPI, SPECTI, PHICB, ISITE, IDEV, IALLEN in the last 72 hours.

## 2021-07-19 NOTE — PROGRESS NOTES
SBAR IN Report: Mother    Verbal report received from CASPER Lee RN (full name & credentials) on this patient, who is now being transferred from [facility] (unit) for routine progression of care. The patient is not wearing a green \"Anesthesia-Duramorph\" band. Report consisted of patient's Situation, Background, Assessment and Recommendations (SBAR).  ID bands were compared with the identification form, and verified with the patient and transferring nurse. Information from the Procedure Summary and the Yasmin Report was reviewed with the transferring nurse; opportunity for questions and clarification provided.

## 2021-07-20 PROCEDURE — 74011250637 HC RX REV CODE- 250/637: Performed by: OBSTETRICS & GYNECOLOGY

## 2021-07-20 PROCEDURE — 65270000029 HC RM PRIVATE

## 2021-07-20 RX ADMIN — IBUPROFEN 800 MG: 800 TABLET, FILM COATED ORAL at 04:48

## 2021-07-20 RX ADMIN — IBUPROFEN 800 MG: 800 TABLET, FILM COATED ORAL at 17:49

## 2021-07-20 RX ADMIN — IBUPROFEN 800 MG: 800 TABLET, FILM COATED ORAL at 11:28

## 2021-07-20 RX ADMIN — OXYCODONE 5 MG: 5 TABLET ORAL at 05:39

## 2021-07-20 RX ADMIN — IBUPROFEN 800 MG: 800 TABLET, FILM COATED ORAL at 23:28

## 2021-07-20 NOTE — PROGRESS NOTES
Safety Teaching reviewed:   1. Hand hygiene prior to handling the infant. 2. Use of bulb syringe  3. Bracelets with matching numbers are placed on mother and infant  3. An infant security tag  [facility]) is placed on the infant's ankle and monitored  5. All OB nurses wear pink Employee badges - do not give your baby to anyone without proper identification. 6. Never leave the baby alone in the room. 7. The infant should be placed on their back to sleep. on a firm mattress. No toys should be placed in the crib. (safe sleep video offered to view)  8. Never shake the baby (video offered to view)  9. Infant fall prevention - do not sleep with the baby, and place the baby in the crib while ambulating. 8. Mother and Baby Care booklet given to Mother.

## 2021-07-20 NOTE — PROGRESS NOTES
Chart reviewed - multiple mental health diagnoses; first time parent. 1100:  SW attempted to meet with patient. Patient with several friends in room. Patient agreeable for  to come back. 1330:  SW attempted to meet with patient. MISHA Wolf present. Questions answered about birth certificate. SW will come back later. 1510:  SW met privately with patient. Discussed patient's current housing situation. She is living with her mother Agnieszka Silva & sister. They are in the process of moving into a different apartment within the same complex over the next few weeks. Patient is employed at [employer], but she hasn't been there long enough to receive FMLA. Patient states that the company is going to hold her job for her until she returns in 4-6 weeks. Patient will not be bringing in any income while she's not working; however, she states that her friend and her friend's  have agreed to contribute financially during this time. During our conversation, MISHA Wolf entered the room. SW'r explained to patient that I'd like to continue our conversation privately tomorrow. Patient agreeable.       LEBRON Moura  [facility]   [phone]

## 2021-07-20 NOTE — LACTATION NOTE
This note was copied from a baby's chart. In to see mom and infant for the first time. Mom was feeding infant formula supplement. Asked mom if she was going to continue with formula feeding or did she want to attempt to breastfeed. She stated that she \"did not know\". Informed her that lactation consultant is available and to call for assistance if desired.

## 2021-07-20 NOTE — PROGRESS NOTES
Post-Partum Day Number 1 Progress Note    Patient doing well  without significant complaints. Pain controlled on current medication. Voiding without difficulty, normal lochia. Vitals:    Visit Vitals  /72 (BP 1 Location: Right upper arm, BP Patient Position: At rest)   Pulse 76   Temp 97.7 °F (36.5 °C)   Resp 18   Ht 5' 4\" (1.626 m)   Wt 158 lb (71.7 kg)   LMP 11/15/2020   SpO2 96%   Breastfeeding Unknown   BMI 27.12 kg/m²       Vital signs stable, afebrile. Exam:  Patient without distress. Heart rrr  Lungs cta b&s               Abdomen soft, fundus firm at level of umbilicus, nontender. Lower extremities are negative for swelling, cords or tenderness. Lab Results   Component Value Date/Time    ABO Group A 01/13/2021 11:42 AM    Rh (D) Positive 01/13/2021 11:42 AM    ABO/Rh(D) A POSITIVE 07/19/2021 07:33 AM        Lab Results   Component Value Date/Time    ABO/Rh(D) A POSITIVE 07/19/2021 07:33 AM    Antibody screen NEG 07/19/2021 07:33 AM     Lab Results   Component Value Date/Time    HGB 13.2 07/19/2021 07:27 AM         Assessment and Plan:  Patient appears to be having uncomplicated post-partum course. Continue routine post-delivery care and maternal education. Breastfeeding. Anticipate discharge home tomorrow.

## 2021-07-20 NOTE — ADDENDUM NOTE
Addendum  created 07/20/21 0746 by Will Field CRNA    Flowsheet accepted, Intraprocedure Flowsheets edited

## 2021-07-21 VITALS
OXYGEN SATURATION: 98 % | SYSTOLIC BLOOD PRESSURE: 105 MMHG | TEMPERATURE: 98.4 F | HEIGHT: 64 IN | DIASTOLIC BLOOD PRESSURE: 63 MMHG | RESPIRATION RATE: 18 BRPM | WEIGHT: 158 LBS | BODY MASS INDEX: 26.98 KG/M2 | HEART RATE: 65 BPM

## 2021-07-21 PROCEDURE — 74011250637 HC RX REV CODE- 250/637: Performed by: OBSTETRICS & GYNECOLOGY

## 2021-07-21 RX ORDER — OXYCODONE HYDROCHLORIDE 5 MG/1
5 TABLET ORAL
Qty: 12 TABLET | Refills: 0 | Status: SHIPPED | OUTPATIENT
Start: 2021-07-21 | End: 2021-07-26

## 2021-07-21 RX ORDER — IBUPROFEN 800 MG/1
800 TABLET ORAL
Qty: 40 TABLET | Refills: 1 | Status: SHIPPED | OUTPATIENT
Start: 2021-07-21

## 2021-07-21 RX ADMIN — IBUPROFEN 800 MG: 800 TABLET, FILM COATED ORAL at 06:16

## 2021-07-21 NOTE — PROGRESS NOTES
Discharge teaching complete. Mother verbalized understanding, questions encouraged. [place] sheet signed.

## 2021-07-21 NOTE — DISCHARGE INSTRUCTIONS
Patient Education   Discharge instruction to follow: Activity: Pelvis rest for 6 weeks     No heavy lifting over 15 lbs for 2 weeks     No driving for 2 weeks     No push/pull motion such as sweeping or vacuuming for 2 weeks     No tub baths for 6 weeks    Continue using the hygenique wand after each void or bowel movement. If using sitz bath continue until comfortable stopping. If using omar-bottle continue to use until comfortable stopping. Change sanitary pad after each urination or bowel movement. Call MD for the following:      Fever over 101 F; pain not relieved by medication; foul smelling vaginal discharge or an increase in vaginal bleeding. Take medication as prescribed. Follow up with MD as order. Vaginal Childbirth: Care Instructions  Overview     Vaginal birth means delivering a baby through the birth canal (vagina). During labor, the uterus tightens (contracts) regularly to thin and open the cervix and to push the baby out through the birth canal.  Your body will slowly heal in the next few weeks. It's easy to get too tired and overwhelmed during the first weeks after your baby is born. Changes in your hormones can shift your mood without warning. You may find it hard to meet the extra demands on your energy and time. Take it easy on yourself. Follow-up care is a key part of your treatment and safety. Be sure to make and go to all appointments, and call your doctor if you are having problems. It's also a good idea to know your test results and keep a list of the medicines you take. How can you care for yourself at home? Vaginal bleeding and cramps  · After delivery, you will have a bloody discharge from your vagina. This will turn pink within a week and then white or yellow after about 10 days. It may last for 2 to 4 weeks or longer, until the uterus has healed. Use sanitary pads until you stop bleeding. Using pads makes it easier to monitor your bleeding.   · Don't worry if you pass some blood clots, as long as they are smaller than a golf ball. If you have a tear or stitches in your vaginal area, change the pad at least every 4 hours. This will help prevent soreness and infection. · You may have cramps for the first few days after childbirth. These are normal and occur as the uterus shrinks to normal size. Take an over-the-counter pain medicine, such as acetaminophen (Tylenol), ibuprofen (Advil, Motrin), or naproxen (Aleve), for cramps. Read and follow all instructions on the label. Do not take aspirin, because it can cause more bleeding. · Do not take two or more pain medicines at the same time unless the doctor told you to. Many pain medicines have acetaminophen, which is Tylenol. Too much acetaminophen (Tylenol) can be harmful. Stitches  · If you have stitches, they will dissolve on their own and don't need to be removed. Follow your doctor's instructions for cleaning the stitched area. · Put ice or a cold pack on your painful area for 10 to 20 minutes at a time, several times a day, for the first few days. Put a thin cloth between the ice and your skin. · Sit in a few inches of warm water (sitz bath) 3 times a day and after bowel movements. The warm water helps with pain and itching. If you don't have a tub, a warm shower might help. Breast fullness  · Your breasts may overfill (engorge) in the first few days after delivery. To help milk flow and to relieve pain, warm your breasts in the shower or by using warm, moist towels before nursing. · If you aren't nursing, don't put warmth on your breasts or touch your breasts. Wear a bra that fits well and use ice until the fullness goes away. This usually takes 2 to 3 days. · Put ice or a cold pack on your breast after nursing to reduce swelling and pain. Put a thin cloth between the ice and your skin. Activity  · Eat a balanced diet. Don't try to lose weight by cutting calories.  Keep taking your prenatal vitamins, or take a multivitamin. · Get as much rest as you can. Try to take naps when your baby sleeps during the day. · Get some exercise every day. But don't do any heavy exercise until your doctor says it is okay. · Wait until you are healed (about 4 to 6 weeks) before you have sexual intercourse. Your doctor will tell you when it is okay to have sex. · If you don't want to get pregnant, talk to your doctor about birth control. You can get pregnant even before your period returns. Also, you can get pregnant while you are breastfeeding. Mental health  · It's normal to have some sadness, anxiety, sleeplessness, and mood swings after you go home. If you feel upset or hopeless for more than a few days or are having trouble doing the things you need to do, talk to your doctor. Constipation and hemorrhoids  · Drink plenty of fluids. If you have kidney, heart, or liver disease and have to limit fluids, talk with your doctor before you increase the amount of fluids you drink. · Eat plenty of fiber each day. Have a bran muffin or bran cereal for breakfast. Try eating a piece of fruit for a mid-afternoon snack. · For painful, itchy hemorrhoids, put ice or a cold pack on the area several times a day for 10 minutes at a time. Follow this by putting a warm compress on the area for another 10 to 20 minutes or by sitting in a shallow, warm bath. When should you call for help? Call  911 anytime you think you may need emergency care. For example, call if:    · You have thoughts of harming yourself, your baby, or another person.     · You passed out (lost consciousness).     · You have chest pain, are short of breath, or cough up blood.     · You have a seizure.    Call your doctor now or seek immediate medical care if:    · You have severe vaginal bleeding.     · You are dizzy or lightheaded, or you feel like you may faint.     · You have a fever.     · You have new or more pain in your belly or pelvis.     · You have symptoms of a blood clot in your leg (called a deep vein thrombosis), such as:  ? Pain in the calf, back of the knee, thigh, or groin. ? Redness and swelling in your leg or groin.     · You have signs of preeclampsia, such as:  ? Sudden swelling of your face, hands, or feet. ? New vision problems (such as dimness, blurring, or seeing spots). ? A severe headache. Watch closely for changes in your health, and be sure to contact your doctor if:    · Your vaginal bleeding seems to be getting heavier.     · You have new or worse vaginal discharge.     · You feel sad, anxious, or hopeless for more than a few days.     · You do not get better as expected. Where can you learn more? Go to http://www.gray.com/  Enter Q237 in the search box to learn more about \"Vaginal Childbirth: Care Instructions. \"  Current as of: October 8, 2020               Content Version: 12.8  © 5485-4074 [employer]. Care instructions adapted under license by [employer] (which disclaims liability or warranty for this information). If you have questions about a medical condition or this instruction, always ask your healthcare professional. [place] [address] any warranty or liability for your use of this information.

## 2021-07-21 NOTE — DISCHARGE SUMMARY
[address] [place] [address] Discharge Summary     Patient ID:  Elsi Schumacher  [id number]  39 y.o.  1980    Admit date: 7/19/2021    Discharge date and time: No discharge date for patient encounter. Admitting Physician: Willene Paget, MD     Discharge Physician: Jovany Spain M.D. 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    Anxiety during pregnancy O99.340, F41.9    Tobacco use in pregnancy O99.330    Primigravida of advanced maternal age in third trimester O200.65    Trichomonal vaginitis in pregnancy O23.599, A59.01    35 weeks gestation of pregnancy Z3A.35    Advanced maternal age, primigravida, third trimester O200.65    Elevated BP without diagnosis of hypertension R03.0    Abdominal pain during pregnancy in third trimester O26.893, R10.9    Normal labor O80, Z37.9        Discharge Diagnoses: Abdominal pain during pregnancy in third trimester [O26.893, R10.9]; Normal labor [O80, Z37.9]; Advanced maternal age, primigravida, third trimester [address] had unremarkeable progressive recovery. and Eating, ambulating, and voiding in a routine manner. Significant Diagnostic Studies: No results found for this or any previous visit (from the past 24 hour(s)).     Procedures: spontaneous vaginal delivery    Discharge Exam:  Visit Vitals  /63 (BP 1 Location: Right arm, BP Patient Position: At rest)   Pulse 65   Temp 98.4 °F (36.9 °C)   Resp 18 Ht 5' 4\" (1.626 m)   Wt 158 lb (71.7 kg)   LMP 11/15/2020   SpO2 98%   Breastfeeding Unknown   BMI 27.12 kg/m²        Heart: regular rate and rhythm, S1, S2 normal, no murmur, click, rub or gallop  Lungs:clear to auscultation bilaterally  Extremities: normal, atraumatic, no cyanosis or edema. No DVT  Incision/episiotomy: no significant drainage, no dehiscence, no significant erythema    Patient Instructions:   Current Discharge Medication List      START taking these medications    Details   ibuprofen (MOTRIN) 800 mg tablet Take 1 Tablet by mouth every eight (8) hours as needed for Pain. Indications: pain  Qty: 40 Tablet, Refills: 1      oxyCODONE IR (ROXICODONE) 5 mg immediate release tablet Take 1 Tablet by mouth every six (6) hours as needed for Pain for up to 5 days. Max Daily Amount: 20 mg. Indications: pain  Qty: 12 Tablet, Refills: 0    Associated Diagnoses: Normal labor         CONTINUE these medications which have NOT CHANGED    Details   diphenhydrAMINE (BenadryL) 25 mg capsule Take 25 mg by mouth every six (6) hours as needed. calcium carbonate (CALCIUM 600 PO) Take  by mouth.      calcium carbonate (TUMS) 200 mg calcium (500 mg) chew Take 1 Tab by mouth daily. cholecalciferol, vitamin D3, (Vitamin D3) 50 mcg (2,000 unit) tab Take  by mouth.      prenatal vit 91/iron/folic/dha (PRENATAL + DHA PO) Take  by mouth.      loratadine (Claritin) 10 mg tablet Take 10 mg by mouth. STOP taking these medications       magnesium oxide 420 mg tab Comments:   Reason for Stopping:         aspirin delayed-release 81 mg tablet Comments:   Reason for Stopping:              Activity: physical activity is restricted per discharge instructions  Diet: resume normal diet  Wound Care: Keep wound clean and dry, as directed    Follow-up with Wai in 2 weeks.     Signed:  Reema Alexandra MD  7/21/2021  8:33 AM
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
## 2022-03-18 PROBLEM — Z3A.35 35 WEEKS GESTATION OF PREGNANCY: Status: ACTIVE | Noted: 2021-07-01

## 2022-03-18 PROBLEM — R03.0 ELEVATED BP WITHOUT DIAGNOSIS OF HYPERTENSION: Status: ACTIVE | Noted: 2021-07-01

## 2022-03-19 PROBLEM — O99.340 PSYCHOLOGICAL DISORDER DURING PREGNANCY: Status: ACTIVE | Noted: 2021-01-13

## 2022-03-19 PROBLEM — O09.513 ADVANCED MATERNAL AGE, PRIMIGRAVIDA, THIRD TRIMESTER: Status: ACTIVE | Noted: 2021-07-01

## 2022-03-19 PROBLEM — O99.330 TOBACCO USE IN PREGNANCY: Status: ACTIVE | Noted: 2021-01-13

## 2022-03-19 PROBLEM — O26.893 ABDOMINAL PAIN DURING PREGNANCY IN THIRD TRIMESTER: Status: ACTIVE | Noted: 2021-07-19

## 2022-03-19 PROBLEM — Z37.9 NORMAL LABOR: Status: ACTIVE | Noted: 2021-07-19

## 2022-03-19 PROBLEM — O09.513 PRIMIGRAVIDA OF ADVANCED MATERNAL AGE IN THIRD TRIMESTER: Status: ACTIVE | Noted: 2021-01-13

## 2022-03-19 PROBLEM — O99.340 ANXIETY DURING PREGNANCY: Status: ACTIVE | Noted: 2021-01-13

## 2022-03-19 PROBLEM — F19.21 HISTORY OF DRUG DEPENDENCE/ABUSE (HCC): Status: ACTIVE | Noted: 2021-01-13

## 2022-03-19 PROBLEM — F41.9 ANXIETY DURING PREGNANCY: Status: ACTIVE | Noted: 2021-01-13

## 2022-03-19 PROBLEM — R10.9 ABDOMINAL PAIN DURING PREGNANCY IN THIRD TRIMESTER: Status: ACTIVE | Noted: 2021-07-19

## 2022-03-20 PROBLEM — O23.599 TRICHOMONAL VAGINITIS IN PREGNANCY: Status: ACTIVE | Noted: 2021-02-02

## 2022-03-20 PROBLEM — A59.01 TRICHOMONAL VAGINITIS IN PREGNANCY: Status: ACTIVE | Noted: 2021-02-02

## 2024-09-30 SDOH — HEALTH STABILITY: PHYSICAL HEALTH: ON AVERAGE, HOW MANY DAYS PER WEEK DO YOU ENGAGE IN MODERATE TO STRENUOUS EXERCISE (LIKE A BRISK WALK)?: 4 DAYS

## 2024-10-02 ENCOUNTER — OFFICE VISIT (OUTPATIENT)
Dept: FAMILY MEDICINE CLINIC | Facility: CLINIC | Age: 44
End: 2024-10-02

## 2024-10-02 VITALS
DIASTOLIC BLOOD PRESSURE: 79 MMHG | BODY MASS INDEX: 22.88 KG/M2 | TEMPERATURE: 97.6 F | WEIGHT: 134 LBS | HEART RATE: 64 BPM | OXYGEN SATURATION: 97 % | HEIGHT: 64 IN | SYSTOLIC BLOOD PRESSURE: 124 MMHG | RESPIRATION RATE: 16 BRPM

## 2024-10-02 DIAGNOSIS — E78.5 DYSLIPIDEMIA: ICD-10-CM

## 2024-10-02 DIAGNOSIS — Z01.419 WELL WOMAN EXAM: ICD-10-CM

## 2024-10-02 DIAGNOSIS — F31.81 BIPOLAR 2 DISORDER, MAJOR DEPRESSIVE EPISODE (HCC): Primary | ICD-10-CM

## 2024-10-02 DIAGNOSIS — F17.200 TOBACCO DEPENDENCE: ICD-10-CM

## 2024-10-02 DIAGNOSIS — O99.340 PSYCHOLOGICAL DISORDER DURING PREGNANCY: ICD-10-CM

## 2024-10-02 PROBLEM — O26.893 ABDOMINAL PAIN DURING PREGNANCY IN THIRD TRIMESTER: Status: RESOLVED | Noted: 2021-07-19 | Resolved: 2024-10-02

## 2024-10-02 PROBLEM — R03.0 ELEVATED BP WITHOUT DIAGNOSIS OF HYPERTENSION: Status: RESOLVED | Noted: 2021-07-01 | Resolved: 2024-10-02

## 2024-10-02 PROBLEM — O99.330 TOBACCO USE IN PREGNANCY: Status: RESOLVED | Noted: 2021-01-13 | Resolved: 2024-10-02

## 2024-10-02 PROBLEM — F41.9 ANXIETY DURING PREGNANCY: Status: RESOLVED | Noted: 2021-01-13 | Resolved: 2024-10-02

## 2024-10-02 PROBLEM — O09.513 PRIMIGRAVIDA OF ADVANCED MATERNAL AGE IN THIRD TRIMESTER: Status: RESOLVED | Noted: 2021-01-13 | Resolved: 2024-10-02

## 2024-10-02 PROBLEM — O09.513 ADVANCED MATERNAL AGE, PRIMIGRAVIDA, THIRD TRIMESTER: Status: RESOLVED | Noted: 2021-07-01 | Resolved: 2024-10-02

## 2024-10-02 PROBLEM — A59.01 TRICHOMONAL VAGINITIS IN PREGNANCY: Status: RESOLVED | Noted: 2021-02-02 | Resolved: 2024-10-02

## 2024-10-02 PROBLEM — R10.9 ABDOMINAL PAIN DURING PREGNANCY IN THIRD TRIMESTER: Status: RESOLVED | Noted: 2021-07-19 | Resolved: 2024-10-02

## 2024-10-02 PROBLEM — Z3A.35 35 WEEKS GESTATION OF PREGNANCY: Status: RESOLVED | Noted: 2021-07-01 | Resolved: 2024-10-02

## 2024-10-02 PROBLEM — O23.599 TRICHOMONAL VAGINITIS IN PREGNANCY: Status: RESOLVED | Noted: 2021-02-02 | Resolved: 2024-10-02

## 2024-10-02 PROBLEM — Z37.9 NORMAL LABOR: Status: RESOLVED | Noted: 2021-07-19 | Resolved: 2024-10-02

## 2024-10-02 LAB
BASOPHILS # BLD: 0 K/UL (ref 0–0.2)
BASOPHILS NFR BLD: 1 % (ref 0–2)
DIFFERENTIAL METHOD BLD: NORMAL
EOSINOPHIL # BLD: 0.2 K/UL (ref 0–0.8)
EOSINOPHIL NFR BLD: 4 % (ref 0.5–7.8)
ERYTHROCYTE [DISTWIDTH] IN BLOOD BY AUTOMATED COUNT: 12.9 % (ref 11.9–14.6)
HCT VFR BLD AUTO: 39.4 % (ref 35.8–46.3)
HGB BLD-MCNC: 13.3 G/DL (ref 11.7–15.4)
IMM GRANULOCYTES # BLD AUTO: 0.1 K/UL (ref 0–0.5)
IMM GRANULOCYTES NFR BLD AUTO: 1 % (ref 0–5)
LYMPHOCYTES # BLD: 1.8 K/UL (ref 0.5–4.6)
LYMPHOCYTES NFR BLD: 34 % (ref 13–44)
MCH RBC QN AUTO: 31.8 PG (ref 26.1–32.9)
MCHC RBC AUTO-ENTMCNC: 33.8 G/DL (ref 31.4–35)
MCV RBC AUTO: 94.3 FL (ref 82–102)
MONOCYTES # BLD: 0.5 K/UL (ref 0.1–1.3)
MONOCYTES NFR BLD: 9 % (ref 4–12)
NEUTS SEG # BLD: 2.6 K/UL (ref 1.7–8.2)
NEUTS SEG NFR BLD: 51 % (ref 43–78)
NRBC # BLD: 0 K/UL (ref 0–0.2)
PLATELET # BLD AUTO: 165 K/UL (ref 150–450)
PMV BLD AUTO: 11 FL (ref 9.4–12.3)
RBC # BLD AUTO: 4.18 M/UL (ref 4.05–5.2)
WBC # BLD AUTO: 5.1 K/UL (ref 4.3–11.1)

## 2024-10-02 RX ORDER — BUPROPION HYDROCHLORIDE 150 MG/1
150 TABLET ORAL EVERY MORNING
Qty: 30 TABLET | Refills: 5 | Status: SHIPPED | OUTPATIENT
Start: 2024-10-02

## 2024-10-02 SDOH — ECONOMIC STABILITY: INCOME INSECURITY: HOW HARD IS IT FOR YOU TO PAY FOR THE VERY BASICS LIKE FOOD, HOUSING, MEDICAL CARE, AND HEATING?: NOT HARD AT ALL

## 2024-10-02 SDOH — ECONOMIC STABILITY: FOOD INSECURITY: WITHIN THE PAST 12 MONTHS, YOU WORRIED THAT YOUR FOOD WOULD RUN OUT BEFORE YOU GOT MONEY TO BUY MORE.: NEVER TRUE

## 2024-10-02 SDOH — ECONOMIC STABILITY: FOOD INSECURITY: WITHIN THE PAST 12 MONTHS, THE FOOD YOU BOUGHT JUST DIDN'T LAST AND YOU DIDN'T HAVE MONEY TO GET MORE.: NEVER TRUE

## 2024-10-02 ASSESSMENT — PATIENT HEALTH QUESTIONNAIRE - PHQ9
8. MOVING OR SPEAKING SO SLOWLY THAT OTHER PEOPLE COULD HAVE NOTICED. OR THE OPPOSITE, BEING SO FIGETY OR RESTLESS THAT YOU HAVE BEEN MOVING AROUND A LOT MORE THAN USUAL: NOT AT ALL
SUM OF ALL RESPONSES TO PHQ QUESTIONS 1-9: 15
SUM OF ALL RESPONSES TO PHQ9 QUESTIONS 1 & 2: 6
1. LITTLE INTEREST OR PLEASURE IN DOING THINGS: NEARLY EVERY DAY
SUM OF ALL RESPONSES TO PHQ QUESTIONS 1-9: 15
3. TROUBLE FALLING OR STAYING ASLEEP: SEVERAL DAYS
6. FEELING BAD ABOUT YOURSELF - OR THAT YOU ARE A FAILURE OR HAVE LET YOURSELF OR YOUR FAMILY DOWN: MORE THAN HALF THE DAYS
SUM OF ALL RESPONSES TO PHQ QUESTIONS 1-9: 15
9. THOUGHTS THAT YOU WOULD BE BETTER OFF DEAD, OR OF HURTING YOURSELF: NOT AT ALL
5. POOR APPETITE OR OVEREATING: SEVERAL DAYS
7. TROUBLE CONCENTRATING ON THINGS, SUCH AS READING THE NEWSPAPER OR WATCHING TELEVISION: MORE THAN HALF THE DAYS
SUM OF ALL RESPONSES TO PHQ QUESTIONS 1-9: 15
10. IF YOU CHECKED OFF ANY PROBLEMS, HOW DIFFICULT HAVE THESE PROBLEMS MADE IT FOR YOU TO DO YOUR WORK, TAKE CARE OF THINGS AT HOME, OR GET ALONG WITH OTHER PEOPLE: EXTREMELY DIFFICULT
2. FEELING DOWN, DEPRESSED OR HOPELESS: NEARLY EVERY DAY
4. FEELING TIRED OR HAVING LITTLE ENERGY: NEARLY EVERY DAY

## 2024-10-02 NOTE — PROGRESS NOTES
Subjective:   Ailin Contreras is a 44 y.o. female presents today with medical problems and to obtain refills if necessary, and they are also meeting me as their new physician for the first time as their initial visit today for mainly depression.    She works at [employer].  She is a single mom.  She has a 3-year-old.  She has not seen her OB/GYN in several years.  She has no medical problems but she does have a history of bipolar disease and other mental illnesses.  Currently she smokes half to 1 pack of cigarettes a day and has done so since age 14.  She vapes both nicotine and flavored products about twice a day.  Does not use any alcohol.  Not currently seeing a therapist although she did 3 years ago well pregnant.  Has failed Zoloft and Lexapro in the past that \"made me mean\" according to her friends and relatives.    She currently feels depressed and anxious.  Tearful.  Hopeless at times.  Not suicidal.  No Known Allergies  PMH, MEDS reviewed  PHQ-9 Total Score: 15 (10/2/2024 10:17 AM)  Thoughts that you would be better off dead, or of hurting yourself in some way: 0 (10/2/2024 10:17 AM)         Objective:   Blood pressure 124/79, pulse 64, temperature 97.6 °F (36.4 °C), resp. rate 16, height 1.626 m (5' 4\"), weight 60.8 kg (134 lb), last menstrual period 09/22/2024, SpO2 97%.  General- Pleasant and no distress  Psych- alert and oriented to person, place and time  Mood and affect are appropriate to situation  Musculoskeletal - Gait and station examination reveals mid-position with no abnormalities.  Neurological- grossly intact.   rrr s mrg.   bcta  Most of today's visit spent preparing to see the patient including review of any prior or current tests, reviewing separate history, performing the above exam, counseling with review of symptoms, disposition, prognosis, and risk/benefit of treatment plan options in addition to limited behavioral counseling and recommendations, and we have talked about risk

## 2024-10-03 LAB
ANION GAP SERPL CALC-SCNC: 13 MMOL/L (ref 9–18)
BUN SERPL-MCNC: 17 MG/DL (ref 6–23)
CALCIUM SERPL-MCNC: 9 MG/DL (ref 8.8–10.2)
CHLORIDE SERPL-SCNC: 108 MMOL/L (ref 98–107)
CO2 SERPL-SCNC: 19 MMOL/L (ref 20–28)
CREAT SERPL-MCNC: 0.91 MG/DL (ref 0.6–1.1)
GLUCOSE SERPL-MCNC: 90 MG/DL (ref 70–99)
POTASSIUM SERPL-SCNC: 4.7 MMOL/L (ref 3.5–5.1)
SODIUM SERPL-SCNC: 140 MMOL/L (ref 136–145)
TSH, 3RD GENERATION: 0.67 UIU/ML (ref 0.27–4.2)

## 2024-10-04 LAB
CHOLEST SERPL-MCNC: 162 MG/DL (ref 0–200)
HDLC SERPL-MCNC: 60 MG/DL (ref 40–60)
HDLC SERPL: 2.7 (ref 0–5)
LDLC SERPL CALC-MCNC: 93 MG/DL (ref 0–100)
TRIGL SERPL-MCNC: 49 MG/DL (ref 0–150)
VLDLC SERPL CALC-MCNC: 10 MG/DL (ref 6–23)

## 2024-11-13 ENCOUNTER — OFFICE VISIT (OUTPATIENT)
Dept: FAMILY MEDICINE CLINIC | Facility: CLINIC | Age: 44
End: 2024-11-13
Payer: COMMERCIAL

## 2024-11-13 VITALS
WEIGHT: 134.2 LBS | BODY MASS INDEX: 22.91 KG/M2 | OXYGEN SATURATION: 97 % | TEMPERATURE: 97.7 F | HEART RATE: 79 BPM | SYSTOLIC BLOOD PRESSURE: 98 MMHG | RESPIRATION RATE: 16 BRPM | HEIGHT: 64 IN | DIASTOLIC BLOOD PRESSURE: 64 MMHG

## 2024-11-13 DIAGNOSIS — F31.81 BIPOLAR 2 DISORDER, MAJOR DEPRESSIVE EPISODE (HCC): Primary | ICD-10-CM

## 2024-11-13 PROCEDURE — 99213 OFFICE O/P EST LOW 20 MIN: CPT | Performed by: FAMILY MEDICINE

## 2024-11-13 ASSESSMENT — PATIENT HEALTH QUESTIONNAIRE - PHQ9
1. LITTLE INTEREST OR PLEASURE IN DOING THINGS: NOT AT ALL
SUM OF ALL RESPONSES TO PHQ9 QUESTIONS 1 & 2: 0
SUM OF ALL RESPONSES TO PHQ QUESTIONS 1-9: 0
2. FEELING DOWN, DEPRESSED OR HOPELESS: NOT AT ALL
SUM OF ALL RESPONSES TO PHQ QUESTIONS 1-9: 0

## 2024-11-13 NOTE — PROGRESS NOTES
Subjective:   Ailin Contreras is a 44 y.o. female presents today for f/u.  Was contacted by OB and her therapist but failed to call them back.  She knows she needs to.  Otherwise  things are not much changed.  Feeling okay.  No big plans for the holidays    No Known Allergies  PMH, MEDS reviewed  PHQ-9 Total Score: 0 (11/13/2024 11:03 AM)         Objective:   Blood pressure 98/64, pulse 79, temperature 97.7 °F (36.5 °C), resp. rate 16, height 1.626 m (5' 4\"), weight 60.9 kg (134 lb 3.2 oz), SpO2 97%.  General- Pleasant and no distress  Psych- alert and oriented to person, place and time  Mood and affect are appropriate to situation  Musculoskeletal - Gait and station examination reveals mid-position with no abnormalities.  Neurological- grossly intact.   rrr s mrg.   bcta  Really encouraged her to reach back out to OB and her therapist because those appointments are important she understands this.  Assessment/Plan:     1. Bipolar 2 disorder, major depressive episode (HCC)         1. Bipolar 2 disorder, major depressive episode (HCC)      Followup:   No follow-ups on file.